# Patient Record
Sex: MALE | Race: WHITE | NOT HISPANIC OR LATINO | Employment: FULL TIME | URBAN - NONMETROPOLITAN AREA
[De-identification: names, ages, dates, MRNs, and addresses within clinical notes are randomized per-mention and may not be internally consistent; named-entity substitution may affect disease eponyms.]

---

## 2022-04-14 ENCOUNTER — APPOINTMENT (INPATIENT)
Dept: ULTRASOUND IMAGING | Facility: HOSPITAL | Age: 57
DRG: 853 | End: 2022-04-14
Payer: COMMERCIAL

## 2022-04-14 ENCOUNTER — APPOINTMENT (EMERGENCY)
Dept: RADIOLOGY | Facility: HOSPITAL | Age: 57
DRG: 853 | End: 2022-04-14
Payer: COMMERCIAL

## 2022-04-14 ENCOUNTER — HOSPITAL ENCOUNTER (INPATIENT)
Facility: HOSPITAL | Age: 57
LOS: 3 days | Discharge: HOME | DRG: 853 | End: 2022-04-17
Attending: EMERGENCY MEDICINE | Admitting: STUDENT IN AN ORGANIZED HEALTH CARE EDUCATION/TRAINING PROGRAM
Payer: COMMERCIAL

## 2022-04-14 ENCOUNTER — APPOINTMENT (EMERGENCY)
Dept: CT IMAGING | Facility: HOSPITAL | Age: 57
DRG: 853 | End: 2022-04-14
Payer: COMMERCIAL

## 2022-04-14 DIAGNOSIS — R06.02 SHORTNESS OF BREATH AT REST: ICD-10-CM

## 2022-04-14 DIAGNOSIS — K81.0 ACUTE CHOLECYSTITIS: ICD-10-CM

## 2022-04-14 DIAGNOSIS — K81.9 CHOLECYSTITIS: Primary | ICD-10-CM

## 2022-04-14 PROBLEM — I10 PRIMARY HYPERTENSION: Status: ACTIVE | Noted: 2022-04-14

## 2022-04-14 PROBLEM — K21.9 GERD (GASTROESOPHAGEAL REFLUX DISEASE): Status: ACTIVE | Noted: 2022-04-14

## 2022-04-14 LAB
ALBUMIN SERPL BCP-MCNC: 2.8 G/DL (ref 3.5–5)
ALP SERPL-CCNC: 123 U/L (ref 46–116)
ALT SERPL W P-5'-P-CCNC: 28 U/L (ref 12–78)
ANION GAP SERPL CALCULATED.3IONS-SCNC: 10 MMOL/L (ref 4–13)
AST SERPL W P-5'-P-CCNC: 48 U/L (ref 5–45)
ATRIAL RATE: 115 BPM
BACTERIA UR QL AUTO: NORMAL /HPF
BASOPHILS # BLD AUTO: 0.03 THOUSANDS/ΜL (ref 0–0.1)
BASOPHILS NFR BLD AUTO: 0 % (ref 0–1)
BILIRUB SERPL-MCNC: 0.8 MG/DL (ref 0.2–1)
BILIRUB UR QL STRIP: ABNORMAL
BUN SERPL-MCNC: 28 MG/DL (ref 5–25)
CALCIUM ALBUM COR SERPL-MCNC: 9.5 MG/DL (ref 8.3–10.1)
CALCIUM SERPL-MCNC: 8.5 MG/DL (ref 8.3–10.1)
CHLORIDE SERPL-SCNC: 96 MMOL/L (ref 100–108)
CLARITY UR: ABNORMAL
CO2 SERPL-SCNC: 24 MMOL/L (ref 21–32)
COLOR UR: YELLOW
CREAT SERPL-MCNC: 1.67 MG/DL (ref 0.6–1.3)
EOSINOPHIL # BLD AUTO: 0.03 THOUSAND/ΜL (ref 0–0.61)
EOSINOPHIL NFR BLD AUTO: 0 % (ref 0–6)
ERYTHROCYTE [DISTWIDTH] IN BLOOD BY AUTOMATED COUNT: 13.9 % (ref 11.6–15.1)
FLUAV RNA RESP QL NAA+PROBE: NEGATIVE
FLUBV RNA RESP QL NAA+PROBE: NEGATIVE
GFR SERPL CREATININE-BSD FRML MDRD: 44 ML/MIN/1.73SQ M
GLUCOSE SERPL-MCNC: 126 MG/DL (ref 65–140)
GLUCOSE UR STRIP-MCNC: NEGATIVE MG/DL
HCT VFR BLD AUTO: 44.8 % (ref 36.5–49.3)
HGB BLD-MCNC: 15.7 G/DL (ref 12–17)
HGB UR QL STRIP.AUTO: NEGATIVE
IMM GRANULOCYTES # BLD AUTO: 0.03 THOUSAND/UL (ref 0–0.2)
IMM GRANULOCYTES NFR BLD AUTO: 0 % (ref 0–2)
KETONES UR STRIP-MCNC: NEGATIVE MG/DL
LACTATE SERPL-SCNC: 1 MMOL/L (ref 0.5–2)
LEUKOCYTE ESTERASE UR QL STRIP: NEGATIVE
LIPASE SERPL-CCNC: 142 U/L (ref 73–393)
LYMPHOCYTES # BLD AUTO: 0.49 THOUSANDS/ΜL (ref 0.6–4.47)
LYMPHOCYTES NFR BLD AUTO: 4 % (ref 14–44)
MCH RBC QN AUTO: 31.2 PG (ref 26.8–34.3)
MCHC RBC AUTO-ENTMCNC: 35 G/DL (ref 31.4–37.4)
MCV RBC AUTO: 89 FL (ref 82–98)
MONOCYTES # BLD AUTO: 0.96 THOUSAND/ΜL (ref 0.17–1.22)
MONOCYTES NFR BLD AUTO: 8 % (ref 4–12)
NEUTROPHILS # BLD AUTO: 10.25 THOUSANDS/ΜL (ref 1.85–7.62)
NEUTS SEG NFR BLD AUTO: 88 % (ref 43–75)
NITRITE UR QL STRIP: NEGATIVE
NON-SQ EPI CELLS URNS QL MICRO: NORMAL /HPF
NRBC BLD AUTO-RTO: 0 /100 WBCS
P AXIS: 29 DEGREES
PH UR STRIP.AUTO: 6.5 [PH]
PLATELET # BLD AUTO: 166 THOUSANDS/UL (ref 149–390)
PMV BLD AUTO: 11.6 FL (ref 8.9–12.7)
POTASSIUM SERPL-SCNC: 3.4 MMOL/L (ref 3.5–5.3)
PR INTERVAL: 164 MS
PROT SERPL-MCNC: 7.5 G/DL (ref 6.4–8.2)
PROT UR STRIP-MCNC: ABNORMAL MG/DL
QRS AXIS: 263 DEGREES
QRSD INTERVAL: 128 MS
QT INTERVAL: 362 MS
QTC INTERVAL: 500 MS
RBC # BLD AUTO: 5.04 MILLION/UL (ref 3.88–5.62)
RBC #/AREA URNS AUTO: NORMAL /HPF
RSV RNA RESP QL NAA+PROBE: NEGATIVE
SARS-COV-2 RNA RESP QL NAA+PROBE: NEGATIVE
SODIUM SERPL-SCNC: 130 MMOL/L (ref 136–145)
SP GR UR STRIP.AUTO: <=1.005 (ref 1–1.03)
T WAVE AXIS: 9 DEGREES
UROBILINOGEN UR QL STRIP.AUTO: 1 E.U./DL
VENTRICULAR RATE: 115 BPM
WBC # BLD AUTO: 11.79 THOUSAND/UL (ref 4.31–10.16)
WBC #/AREA URNS AUTO: NORMAL /HPF

## 2022-04-14 PROCEDURE — 36415 COLL VENOUS BLD VENIPUNCTURE: CPT | Performed by: EMERGENCY MEDICINE

## 2022-04-14 PROCEDURE — 93010 ELECTROCARDIOGRAM REPORT: CPT | Performed by: INTERNAL MEDICINE

## 2022-04-14 PROCEDURE — 99285 EMERGENCY DEPT VISIT HI MDM: CPT | Performed by: EMERGENCY MEDICINE

## 2022-04-14 PROCEDURE — 83690 ASSAY OF LIPASE: CPT | Performed by: EMERGENCY MEDICINE

## 2022-04-14 PROCEDURE — C9113 INJ PANTOPRAZOLE SODIUM, VIA: HCPCS | Performed by: PHYSICIAN ASSISTANT

## 2022-04-14 PROCEDURE — 74177 CT ABD & PELVIS W/CONTRAST: CPT

## 2022-04-14 PROCEDURE — NC001 PR NO CHARGE: Performed by: PHYSICIAN ASSISTANT

## 2022-04-14 PROCEDURE — 96360 HYDRATION IV INFUSION INIT: CPT

## 2022-04-14 PROCEDURE — 99233 SBSQ HOSP IP/OBS HIGH 50: CPT | Performed by: INTERNAL MEDICINE

## 2022-04-14 PROCEDURE — 81001 URINALYSIS AUTO W/SCOPE: CPT | Performed by: EMERGENCY MEDICINE

## 2022-04-14 PROCEDURE — 83605 ASSAY OF LACTIC ACID: CPT | Performed by: EMERGENCY MEDICINE

## 2022-04-14 PROCEDURE — 99285 EMERGENCY DEPT VISIT HI MDM: CPT

## 2022-04-14 PROCEDURE — 76705 ECHO EXAM OF ABDOMEN: CPT

## 2022-04-14 PROCEDURE — 0241U HB NFCT DS VIR RESP RNA 4 TRGT: CPT | Performed by: EMERGENCY MEDICINE

## 2022-04-14 PROCEDURE — 85025 COMPLETE CBC W/AUTO DIFF WBC: CPT | Performed by: EMERGENCY MEDICINE

## 2022-04-14 PROCEDURE — 80053 COMPREHEN METABOLIC PANEL: CPT | Performed by: EMERGENCY MEDICINE

## 2022-04-14 PROCEDURE — G1004 CDSM NDSC: HCPCS

## 2022-04-14 PROCEDURE — 93005 ELECTROCARDIOGRAM TRACING: CPT

## 2022-04-14 PROCEDURE — 71045 X-RAY EXAM CHEST 1 VIEW: CPT

## 2022-04-14 RX ORDER — ACETAMINOPHEN 325 MG/1
650 TABLET ORAL ONCE
Status: COMPLETED | OUTPATIENT
Start: 2022-04-14 | End: 2022-04-14

## 2022-04-14 RX ORDER — FUROSEMIDE 20 MG/1
20 TABLET ORAL DAILY
COMMUNITY

## 2022-04-14 RX ORDER — HEPARIN SODIUM 5000 [USP'U]/ML
5000 INJECTION, SOLUTION INTRAVENOUS; SUBCUTANEOUS EVERY 8 HOURS SCHEDULED
Status: DISCONTINUED | OUTPATIENT
Start: 2022-04-14 | End: 2022-04-15

## 2022-04-14 RX ORDER — SODIUM CHLORIDE 9 MG/ML
125 INJECTION, SOLUTION INTRAVENOUS CONTINUOUS
Status: DISCONTINUED | OUTPATIENT
Start: 2022-04-14 | End: 2022-04-15

## 2022-04-14 RX ORDER — OMEPRAZOLE 40 MG/1
40 CAPSULE, DELAYED RELEASE ORAL DAILY
COMMUNITY

## 2022-04-14 RX ORDER — AMLODIPINE BESYLATE 10 MG/1
10 TABLET ORAL DAILY
COMMUNITY

## 2022-04-14 RX ORDER — HYDROMORPHONE HCL/PF 1 MG/ML
0.5 SYRINGE (ML) INJECTION EVERY 4 HOURS PRN
Status: DISCONTINUED | OUTPATIENT
Start: 2022-04-14 | End: 2022-04-17 | Stop reason: HOSPADM

## 2022-04-14 RX ORDER — HYDROCHLOROTHIAZIDE 12.5 MG/1
12.5 TABLET ORAL DAILY
COMMUNITY

## 2022-04-14 RX ORDER — HYDRALAZINE HYDROCHLORIDE 20 MG/ML
10 INJECTION INTRAMUSCULAR; INTRAVENOUS EVERY 6 HOURS PRN
Status: DISCONTINUED | OUTPATIENT
Start: 2022-04-14 | End: 2022-04-17 | Stop reason: HOSPADM

## 2022-04-14 RX ORDER — ONDANSETRON 2 MG/ML
4 INJECTION INTRAMUSCULAR; INTRAVENOUS EVERY 8 HOURS PRN
Status: DISCONTINUED | OUTPATIENT
Start: 2022-04-14 | End: 2022-04-17 | Stop reason: HOSPADM

## 2022-04-14 RX ORDER — FENOFIBRATE 54 MG/1
54 TABLET ORAL DAILY
COMMUNITY

## 2022-04-14 RX ORDER — HYDROMORPHONE HCL IN WATER/PF 6 MG/30 ML
0.2 PATIENT CONTROLLED ANALGESIA SYRINGE INTRAVENOUS EVERY 4 HOURS PRN
Status: DISCONTINUED | OUTPATIENT
Start: 2022-04-14 | End: 2022-04-16

## 2022-04-14 RX ORDER — AMLODIPINE BESYLATE 10 MG/1
10 TABLET ORAL DAILY
Status: DISCONTINUED | OUTPATIENT
Start: 2022-04-14 | End: 2022-04-17 | Stop reason: HOSPADM

## 2022-04-14 RX ORDER — LANOLIN ALCOHOL/MO/W.PET/CERES
3 CREAM (GRAM) TOPICAL ONCE
Status: COMPLETED | OUTPATIENT
Start: 2022-04-14 | End: 2022-04-14

## 2022-04-14 RX ORDER — ACETAMINOPHEN 325 MG/1
650 TABLET ORAL EVERY 6 HOURS PRN
Status: DISCONTINUED | OUTPATIENT
Start: 2022-04-14 | End: 2022-04-17 | Stop reason: HOSPADM

## 2022-04-14 RX ORDER — PANTOPRAZOLE SODIUM 40 MG/1
40 INJECTION, POWDER, FOR SOLUTION INTRAVENOUS
Status: DISCONTINUED | OUTPATIENT
Start: 2022-04-14 | End: 2022-04-17 | Stop reason: HOSPADM

## 2022-04-14 RX ORDER — LISINOPRIL 40 MG/1
40 TABLET ORAL DAILY
COMMUNITY

## 2022-04-14 RX ADMIN — SODIUM CHLORIDE 1000 ML: 0.9 INJECTION, SOLUTION INTRAVENOUS at 11:27

## 2022-04-14 RX ADMIN — HEPARIN SODIUM 5000 UNITS: 5000 INJECTION INTRAVENOUS; SUBCUTANEOUS at 21:23

## 2022-04-14 RX ADMIN — AMLODIPINE BESYLATE 10 MG: 10 TABLET ORAL at 14:38

## 2022-04-14 RX ADMIN — ACETAMINOPHEN 325MG 650 MG: 325 TABLET ORAL at 13:22

## 2022-04-14 RX ADMIN — IOHEXOL 100 ML: 350 INJECTION, SOLUTION INTRAVENOUS at 12:55

## 2022-04-14 RX ADMIN — Medication 3 MG: at 21:22

## 2022-04-14 RX ADMIN — HEPARIN SODIUM 5000 UNITS: 5000 INJECTION INTRAVENOUS; SUBCUTANEOUS at 14:38

## 2022-04-14 RX ADMIN — PIPERACILLIN AND TAZOBACTAM 3.38 G: 36; 4.5 INJECTION, POWDER, FOR SOLUTION INTRAVENOUS at 21:59

## 2022-04-14 RX ADMIN — ACETAMINOPHEN 325MG 650 MG: 325 TABLET ORAL at 19:43

## 2022-04-14 RX ADMIN — SODIUM CHLORIDE 125 ML/HR: 0.9 INJECTION, SOLUTION INTRAVENOUS at 14:51

## 2022-04-14 RX ADMIN — PIPERACILLIN AND TAZOBACTAM 3.38 G: 36; 4.5 INJECTION, POWDER, FOR SOLUTION INTRAVENOUS at 15:51

## 2022-04-14 RX ADMIN — PANTOPRAZOLE SODIUM 40 MG: 40 INJECTION, POWDER, FOR SOLUTION INTRAVENOUS at 14:38

## 2022-04-14 NOTE — H&P
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  - Hiatal hernia  - Hx of Perry's esophagus  - C/O short of breath, o2 SAT 97% on RA, chest XR without abnormality, CT with trace right pleural effusion  - Tmax 102 6 in ED, denies subjective fevers at this time  - Leukocytosis 11 79  - Lipase 142  - Tbili 0 80  - Lactic acid 1 0  - PRICILLA, Cr 1 67, likely dehydration  - eGFR 44  - Hx of HTN  - Diverticulosis found on CT  - Hx of bilateral inguinal hernias as a child  - COVID negative at this time, positive several months ago    Plan:  Admit to general surgery  Consult SLIM for SOB  NPO   RUQ US (ordered)  Medicate PRN pain/nausea  IV PPI  IVF hydration  Monitor I/Os  Heparin anticoagulation  SCD's  Urinary retention protocol  Follow qAM CBC and CMP  UA pending  Appreciate SLIM recs for comorbidities      History of Present Illness     HPI:  Ciara Fox is a 60 y o  male with a PMH of Perry's esophagus and HTN who presents with complaints of abdominal pain, nasuea/vomiting, chills and fevers that began Saturday  He states that his wife works at a nursing home and came home with a "GI bug" on Friday experiencing similar symptoms  Her issues have since resolved  He continues with complaints of short of breath, nausea, and epigastric pain that does not radiated  Denies chest pain  He last ate scrambled eggs this morning and did not experience n/v with this meal  He states that for the most part his abdominal pain has resolved and complains mainly of short of breath  Denies prior similar symptoms  He states that his urine has been concentrated and his mouth is dry from not drinking over the past several days  He also states that everything has a metallic taste since he had COVID   CT in the ED revealed "Diffusely thick-walled with surrounding edema and fluid   There is focal hyperdensity along the proximal lateral wall as noted on series 2/37, which is potentially external; this hyperdensity is along the course of the cystic artery, and when compared to the adjacent cystic duct shows focal dilation at this level "     Review of Systems   Constitutional: Positive for appetite change, fatigue and fever  HENT: Negative  Eyes: Negative  Respiratory: Positive for shortness of breath  Cardiovascular: Negative  Gastrointestinal: Positive for abdominal pain, nausea and vomiting  Negative for abdominal distention, blood in stool, constipation and diarrhea  Endocrine: Negative  Genitourinary: Positive for decreased urine volume  Musculoskeletal: Negative  Skin: Negative  Neurological: Negative  Hematological: Negative  Psychiatric/Behavioral: Negative  Historical Information   Past Medical History:   Diagnosis Date    Eprry's esophageal ulceration 01/05/2002    Hypertension      History reviewed  No pertinent surgical history    Social History   Social History     Substance and Sexual Activity   Alcohol Use Yes    Alcohol/week: 4 0 standard drinks    Types: 4 Cans of beer per week     Social History     Substance and Sexual Activity   Drug Use Not on file     Social History     Tobacco Use   Smoking Status Never Smoker   Smokeless Tobacco Not on file     E-Cigarette/Vaping     E-Cigarette/Vaping Substances     Family History: non-contributory    Meds/Allergies   all medications and allergies reviewed  No Known Allergies    Objective   First Vitals:   Blood Pressure: (!) 178/89 (04/14/22 1116)  Pulse: (!) 117 (04/14/22 1116)  Temperature: 98 1 °F (36 7 °C) (04/14/22 1116)  Temp Source: Temporal (04/14/22 1116)  Respirations: (!) 28 (04/14/22 1116)  Height: 6' 4" (193 cm) (04/14/22 1116)  Weight - Scale: 132 kg (292 lb 1 8 oz) (04/14/22 1116)  SpO2: 95 % (04/14/22 1116)    Current Vitals:   Blood Pressure: 162/82 (04/14/22 1230)  Pulse: 101 (04/14/22 1230)  Temperature: (!) 102 6 °F (39 2 °C) (04/14/22 1316)  Temp Source: Oral (04/14/22 1316)  Respirations: 21 (04/14/22 1230)  Height: 6' 4" (193 cm) (04/14/22 1116)  Weight - Scale: 132 kg (292 lb 1 8 oz) (04/14/22 1116)  SpO2: 97 % (04/14/22 1230)    No intake or output data in the 24 hours ending 04/14/22 1352    Invasive Devices  Report    Peripheral Intravenous Line            Peripheral IV 04/14/22 Right Antecubital <1 day                Physical Exam  Vitals and nursing note reviewed  Constitutional:       General: He is not in acute distress  Appearance: He is obese  He is not ill-appearing  HENT:      Head: Normocephalic and atraumatic  Right Ear: External ear normal       Left Ear: External ear normal       Nose: Nose normal       Mouth/Throat:      Mouth: Mucous membranes are dry  Pharynx: Oropharynx is clear  No oropharyngeal exudate or posterior oropharyngeal erythema  Eyes:      General: No scleral icterus  Extraocular Movements: Extraocular movements intact  Conjunctiva/sclera: Conjunctivae normal       Pupils: Pupils are equal, round, and reactive to light  Cardiovascular:      Rate and Rhythm: Normal rate and regular rhythm  Pulses: Normal pulses  Heart sounds: Normal heart sounds  No murmur heard  Pulmonary:      Effort: Pulmonary effort is normal  No respiratory distress  Breath sounds: Normal breath sounds  Chest:      Chest wall: No tenderness  Abdominal:      Comments: Obese, minimal tenderness in RUQ, negative Cox sign, softly distended (baseline per patient), no guarding or rebound, bowel sounds present, no masses, organomegaly or hernias appreciated   Musculoskeletal:         General: Normal range of motion  Cervical back: Normal range of motion and neck supple  No rigidity  Right lower leg: No edema  Left lower leg: No edema  Skin:     General: Skin is warm and dry  Capillary Refill: Capillary refill takes less than 2 seconds  Coloration: Skin is not jaundiced  Neurological:      General: No focal deficit present  Mental Status: He is alert and oriented to person, place, and time  Psychiatric:         Mood and Affect: Mood normal          Behavior: Behavior normal          Thought Content: Thought content normal          Judgment: Judgment normal        Lab Results:   I have personally reviewed pertinent lab results  CBC:   Lab Results   Component Value Date    WBC 11 79 (H) 04/14/2022    HGB 15 7 04/14/2022    HCT 44 8 04/14/2022    MCV 89 04/14/2022     04/14/2022    MCH 31 2 04/14/2022    MCHC 35 0 04/14/2022    RDW 13 9 04/14/2022    MPV 11 6 04/14/2022    NRBC 0 04/14/2022     CMP:   Lab Results   Component Value Date    SODIUM 130 (L) 04/14/2022    K 3 4 (L) 04/14/2022    CL 96 (L) 04/14/2022    CO2 24 04/14/2022    BUN 28 (H) 04/14/2022    CREATININE 1 67 (H) 04/14/2022    CALCIUM 8 5 04/14/2022    AST 48 (H) 04/14/2022    ALT 28 04/14/2022    ALKPHOS 123 (H) 04/14/2022    EGFR 44 04/14/2022     Lipase:   Lab Results   Component Value Date    LIPASE 142 04/14/2022     Imaging: I have personally reviewed pertinent reports  CT abd/pelvis w contrast 4/14/22  GALLBLADDER:  Diffusely thick-walled with surrounding edema and fluid  There is focal hyperdensity along the proximal lateral wall as noted on series 2/37, which is potentially external; this hyperdensity is along the course of the cystic artery, and when compared to the adjacent cystic duct shows focal dilation at this level  Impression:     Findings consistent with acute cholecystitis  William Gavel is a focal hyperdensity near the wall as described, this either represents a stone, or may represent a focal aneurysm of the cystic artery   Consider ultrasound for further evaluation of this would alter surgical management        EKG, Pathology, and Other Studies: I have personally reviewed pertinent reports  Counseling / Coordination of Care  Total floor / unit time spent today 40 minutes  Greater than 50% of total time was spent with the patient and / or family counseling and / or coordination of care  A description of the counseling / coordination of care: review of labs and imaging, obtaining history, performing exam, discussion of treatment plan      Katey Thrasher PA-C

## 2022-04-14 NOTE — ED PROVIDER NOTES
History  Chief Complaint   Patient presents with    Shortness of Breath     Over the weekend with fever, aching joints, chills, nausea and diarrhea  Now with increased thirst  Patient states "I don't feel short of breath but I breath heavily"     Patient states starting 1 week ago he had 3 days of abdominal pain which have since resolved  He states he had diarrhea, nausea, chills, fatigue, vomiting, subjective fevers, body aches, shortness of breath  He states he has had 2 COVID vaccinations  He denies chest pain  History provided by:  Patient   used: No    Flu Symptoms  Presenting symptoms: diarrhea, fatigue, fever, myalgias, nausea, shortness of breath and sore throat    Presenting symptoms: no cough, no headaches and no vomiting    Severity:  Moderate  Onset quality:  Gradual  Duration:  1 week  Progression:  Unchanged  Chronicity:  New  Relieved by:  Nothing  Worsened by:  Nothing  Ineffective treatments:  None tried  Associated symptoms: chills    Associated symptoms: no ear pain, no neck stiffness and no syncope    Associated symptoms comment:  Abdominal pain      Prior to Admission Medications   Prescriptions Last Dose Informant Patient Reported? Taking? amLODIPine (NORVASC) 10 mg tablet   Yes No   Sig: Take 10 mg by mouth daily   fenofibrate (TRICOR) 54 MG tablet   Yes No   Sig: Take 54 mg by mouth daily   furosemide (LASIX) 20 mg tablet   Yes No   Sig: Take 20 mg by mouth daily   hydrochlorothiazide (HYDRODIURIL) 12 5 mg tablet   Yes No   Sig: Take 12 5 mg by mouth daily   lisinopril (ZESTRIL) 40 mg tablet   Yes No   Sig: Take 40 mg by mouth daily   omeprazole (PriLOSEC) 40 MG capsule   Yes No   Sig: Take 40 mg by mouth daily      Facility-Administered Medications: None       Past Medical History:   Diagnosis Date    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    Hypertension        History reviewed  No pertinent surgical history  History reviewed   No pertinent family history  I have reviewed and agree with the history as documented  E-Cigarette/Vaping     E-Cigarette/Vaping Substances     Social History     Tobacco Use    Smoking status: Never Smoker    Smokeless tobacco: Not on file   Substance Use Topics    Alcohol use: Yes     Alcohol/week: 4 0 standard drinks     Types: 4 Cans of beer per week    Drug use: Not on file       Review of Systems   Constitutional: Positive for chills, fatigue and fever  HENT: Positive for sore throat  Negative for ear pain, hearing loss, trouble swallowing and voice change  Eyes: Negative for pain and discharge  Respiratory: Positive for shortness of breath  Negative for cough and wheezing  Cardiovascular: Negative for chest pain and palpitations  Gastrointestinal: Positive for diarrhea and nausea  Negative for abdominal pain, blood in stool, constipation and vomiting  Genitourinary: Negative for dysuria, flank pain, frequency and hematuria  Musculoskeletal: Positive for myalgias  Negative for joint swelling, neck pain and neck stiffness  Skin: Negative for rash and wound  Neurological: Negative for dizziness, seizures, syncope, facial asymmetry and headaches  Psychiatric/Behavioral: Negative for hallucinations, self-injury and suicidal ideas  All other systems reviewed and are negative  Physical Exam  Physical Exam  Vitals and nursing note reviewed  Constitutional:       General: He is not in acute distress  Appearance: He is well-developed  HENT:      Head: Normocephalic and atraumatic  Right Ear: External ear normal       Left Ear: External ear normal    Eyes:      General: No scleral icterus  Right eye: No discharge  Left eye: No discharge  Extraocular Movements: Extraocular movements intact  Conjunctiva/sclera: Conjunctivae normal    Cardiovascular:      Rate and Rhythm: Regular rhythm  Tachycardia present  Heart sounds: Normal heart sounds   No murmur heard       Pulmonary:      Effort: Pulmonary effort is normal       Breath sounds: Normal breath sounds  No decreased breath sounds, wheezing, rhonchi or rales  Abdominal:      General: Bowel sounds are normal  There is no distension  Palpations: Abdomen is soft  Tenderness: There is no abdominal tenderness  There is no guarding or rebound  Musculoskeletal:         General: No deformity  Normal range of motion  Cervical back: Normal range of motion and neck supple  Skin:     General: Skin is warm and dry  Capillary Refill: Capillary refill takes less than 2 seconds  Findings: No rash  Neurological:      General: No focal deficit present  Mental Status: He is alert and oriented to person, place, and time  Cranial Nerves: No cranial nerve deficit  Psychiatric:         Mood and Affect: Mood normal          Behavior: Behavior normal          Thought Content:  Thought content normal          Judgment: Judgment normal          Vital Signs  ED Triage Vitals [04/14/22 1116]   Temperature Pulse Respirations Blood Pressure SpO2   98 1 °F (36 7 °C) (!) 117 (!) 28 (!) 178/89 95 %      Temp Source Heart Rate Source Patient Position - Orthostatic VS BP Location FiO2 (%)   Temporal Monitor -- Left arm --      Pain Score       No Pain           Vitals:    04/14/22 1116 04/14/22 1200 04/14/22 1230 04/14/22 1437   BP: (!) 178/89 156/80 162/82 162/82   Pulse: (!) 117 105 101 100         Visual Acuity      ED Medications  Medications   ondansetron (ZOFRAN) injection 4 mg (has no administration in time range)   heparin (porcine) subcutaneous injection 5,000 Units (5,000 Units Subcutaneous Given 4/14/22 1438)   HYDROmorphone (DILAUDID) injection 0 5 mg (has no administration in time range)   HYDROmorphone HCl (DILAUDID) injection 0 2 mg (has no administration in time range)   pantoprazole (PROTONIX) injection 40 mg (40 mg Intravenous Given 4/14/22 1438)   amLODIPine (NORVASC) tablet 10 mg (10 mg Oral Given 4/14/22 1438)   sodium chloride 0 9 % infusion (125 mL/hr Intravenous New Bag 4/14/22 1451)   sodium chloride 0 9 % bolus 1,000 mL (0 mL Intravenous Stopped 4/14/22 1227)   iohexol (OMNIPAQUE) 350 MG/ML injection (SINGLE-DOSE) 100 mL (100 mL Intravenous Given 4/14/22 1255)   acetaminophen (TYLENOL) tablet 650 mg (650 mg Oral Given 4/14/22 1322)       Diagnostic Studies  Results Reviewed     Procedure Component Value Units Date/Time    Comprehensive metabolic panel [[id number]]  (Abnormal) Collected: 04/14/22 1126    Lab Status: Final result Specimen: Blood from Arm, Right Updated: 04/14/22 1213     Sodium 130 mmol/L      Potassium 3 4 mmol/L      Chloride 96 mmol/L      CO2 24 mmol/L      ANION GAP 10 mmol/L      BUN 28 mg/dL      Creatinine 1 67 mg/dL      Glucose 126 mg/dL      Calcium 8 5 mg/dL      Corrected Calcium 9 5 mg/dL      AST 48 U/L      ALT 28 U/L      Alkaline Phosphatase 123 U/L      Total Protein 7 5 g/dL      Albumin 2 8 g/dL      Total Bilirubin 0 80 mg/dL      eGFR 44 ml/min/1 73sq m     Narrative:      [place] guidelines for Chronic Kidney Disease (CKD):     Stage 1 with normal or high GFR (GFR > 90 mL/min/1 73 square meters)    Stage 2 Mild CKD (GFR = 60-89 mL/min/1 73 square meters)    Stage 3A Moderate CKD (GFR = 45-59 mL/min/1 73 square meters)    Stage 3B Moderate CKD (GFR = 30-44 mL/min/1 73 square meters)    Stage 4 Severe CKD (GFR = 15-29 mL/min/1 73 square meters)    Stage 5 End Stage CKD (GFR <15 mL/min/1 73 square meters)  Note: GFR calculation is accurate only with a steady state creatinine    Lipase [[id number]]  (Normal) Collected: 04/14/22 1126    Lab Status: Final result Specimen: Blood from Arm, Right Updated: 04/14/22 1213     Lipase 142 u/L     COVID19, Influenza A/B, RSV PCR, [facility] [[id number]]  (Normal) Collected: 04/14/22 1126    Lab Status: Final result Specimen: Nares from Nose Updated: 04/14/22 1211     SARS-CoV-2 Negative INFLUENZA A PCR Negative     INFLUENZA B PCR Negative     RSV PCR Negative    Narrative:      FOR PEDIATRIC PATIENTS - copy/paste COVID Guidelines URL to browser: [email or web address]    SARS-CoV-2 assay is a Nucleic Acid Amplification assay intended for the  qualitative detection of nucleic acid from SARS-CoV-2 in nasopharyngeal  swabs  Results are for the presumptive identification of SARS-CoV-2 RNA  Positive results are indicative of infection with SARS-CoV-2, the virus  causing COVID-19, but do not rule out bacterial infection or co-infection  with other viruses  Laboratories within the [place] and its  territories are required to report all positive results to the appropriate  public health authorities  Negative results do not preclude SARS-CoV-2  infection and should not be used as the sole basis for treatment or other  patient management decisions  Negative results must be combined with  clinical observations, patient history, and epidemiological information  This test has not been FDA cleared or approved  This test has been authorized by FDA under an Emergency Use Authorization  (EUA)  This test is only authorized for the duration of time the  declaration that circumstances exist justifying the authorization of the  emergency use of an in vitro diagnostic tests for detection of SARS-CoV-2  virus and/or diagnosis of COVID-19 infection under section 564(b)(1) of  the Act, 21 U  S C  220HFX-0(R)(1), unless the authorization is terminated  or revoked sooner  The test has been validated but independent review by FDA  and CLIA is pending  Test performed using [employer] Gene[employer]pert: This RT-PCR assay targets N2,  a region unique to SARS-CoV-2  A conserved region in the E-gene was chosen  for pan-Sarbecovirus detection which includes SARS-CoV-2      Lactic acid, plasma [[id number]]  (Normal) Collected: 04/14/22 1126    Lab Status: Final result Specimen: Blood from Arm, Right Updated: 04/14/22 1159     LACTIC ACID 1 0 mmol/L     Narrative:      Result may be elevated if tourniquet was used during collection  CBC and differential [[id number]]  (Abnormal) Collected: 04/14/22 1126    Lab Status: Final result Specimen: Blood from Arm, Right Updated: 04/14/22 1135     WBC 11 79 Thousand/uL      RBC 5 04 Million/uL      Hemoglobin 15 7 g/dL      Hematocrit 44 8 %      MCV 89 fL      MCH 31 2 pg      MCHC 35 0 g/dL      RDW 13 9 %      MPV 11 6 fL      Platelets 493 Thousands/uL      nRBC 0 /100 WBCs      Neutrophils Relative 88 %      Immat GRANS % 0 %      Lymphocytes Relative 4 %      Monocytes Relative 8 %      Eosinophils Relative 0 %      Basophils Relative 0 %      Neutrophils Absolute 10 25 Thousands/µL      Immature Grans Absolute 0 03 Thousand/uL      Lymphocytes Absolute 0 49 Thousands/µL      Monocytes Absolute 0 96 Thousand/µL      Eosinophils Absolute 0 03 Thousand/µL      Basophils Absolute 0 03 Thousands/µL     UA w Reflex to Microscopic w Reflex to Culture [[id number]]     Lab Status: No result Specimen: Urine                  CT abdomen pelvis w contrast   Final Result by Ab Casey MD (04/14 1317)      Findings consistent with acute cholecystitis  There is a focal hyperdensity near the wall as described, this either represents a stone, or may represent a focal aneurysm of the cystic artery  Consider ultrasound for further evaluation of this would    altered surgical management  I personally discussed this study  with Cande Domingo on 4/14/2022 at 1:15 PM             Workstation performed: [id number]         XR chest portable   Final Result by Ciara Escalante MD (04/14 1200)      No acute cardiopulmonary disease                    Workstation performed: [id number]         US right upper quadrant    (Results Pending)              Procedures  ECG 12 Lead Documentation Only    Date/Time: 4/14/2022 11:16 AM  Performed by: Stacey Duran MD  Authorized by: Sid Nunez MD     ECG reviewed by me, the ED Provider: yes    Patient location:  ED  Previous ECG:     Previous ECG:  Unavailable  Interpretation:     Interpretation: non-specific    Rate:     ECG rate:  115    ECG rate assessment: tachycardic    Rhythm:     Rhythm: sinus tachycardia    Ectopy:     Ectopy: none    QRS:     QRS axis:  Normal    QRS intervals:  Normal  Conduction:     Conduction: abnormal      Abnormal conduction: complete RBBB    ST segments:     ST segments:  Normal  T waves:     T waves: normal               ED Course  ED Course as of 04/14/22 1509   Thu Apr 14, 2022   1320 Surgery contacted                               SBIRT 22yo+      Most Recent Value   SBIRT (25 yo +)    In order to provide better care to our patients, we are screening all of our patients for alcohol and drug use  Would it be okay to ask you these screening questions? Yes Filed at: 04/14/2022 1120   Initial Alcohol Screen: US AUDIT-C     1  How often do you have a drink containing alcohol? 1 Filed at: 04/14/2022 1120   2  How many drinks containing alcohol do you have on a typical day you are drinking? 0 Filed at: 04/14/2022 1120   3a  Male UNDER 65: How often do you have five or more drinks on one occasion? 0 Filed at: 04/14/2022 1120   3b  FEMALE Any Age, or MALE 65+: How often do you have 4 or more drinks on one occassion? 0 Filed at: 04/14/2022 1120   Audit-C Score 1 Filed at: 04/14/2022 1120   YUNI: How many times in the past year have you    Used an illegal drug or used a prescription medication for non-medical reasons?  Never Filed at: 04/14/2022 1120                    MDM  Number of Diagnoses or Management Options     Amount and/or Complexity of Data Reviewed  Clinical lab tests: ordered and reviewed  Tests in the radiology section of CPT®: ordered and reviewed  Decide to obtain previous medical records or to obtain history from someone other than the patient: yes  Review and summarize past medical records: yes  Independent visualization of images, tracings, or specimens: yes        Disposition  Final diagnoses:   Cholecystitis     Time reflects when diagnosis was documented in both MDM as applicable and the Disposition within this note     Time User Action Codes Description Comment    4/14/2022  1:47 PM Alishamary lou Alexander Add [K81 9] Cholecystitis     4/14/2022  2:30 PM Maciel Yancey Add [R06 02] Shortness of breath at rest       ED Disposition     ED Disposition Condition Date/Time Comment    Admit Stable Thu Apr 14, 2022  1:47 PM Case was discussed with Dr Rachel Fuentes and the patient's admission status was agreed to be Admission Status: inpatient status to the service of Dr Rachel Fuentes   Follow-up Information    None         Patient's Medications   Discharge Prescriptions    No medications on file       No discharge procedures on file      PDMP Review     None          ED Provider  Electronically Signed by           Marina Fu MD  04/14/22 7468

## 2022-04-14 NOTE — ASSESSMENT & PLAN NOTE
- presents with five-day history of abdominal pain, nausea, and vomiting  - CT scan consistent with acute cholecystitis    - admitted to general surgery service  - start empiric Zosyn  - follow-up right upper quadrant ultrasound  - possible OR tomorrow  - NPO sips with meds

## 2022-04-14 NOTE — ASSESSMENT & PLAN NOTE
- hold home HCTZ, lasix and lisinopril for now as patient is NPO and dehydrated  - continue home amlodipine  - p r n  hydralazine for SBP greater than 180

## 2022-04-14 NOTE — CONSULTS
[address] [facility],[address] Floor 1965, 60 y o  male MRN: [id number]  Unit/Bed#: ED 01 Encounter: [id number]  Primary Care Provider: No primary care provider on file  Date and time admitted to hospital: 4/14/2022 11:11 AM    Inpatient consult to Internal Medicine  Consult performed by: Hope Bonilla DO  Consult ordered by: Arsen Sprague PA-C          * Acute cholecystitis  Assessment & Plan  - presents with five-day history of abdominal pain, nausea, and vomiting  - CT scan consistent with acute cholecystitis    - admitted to general surgery service  - start empiric Zosyn  - follow-up right upper quadrant ultrasound  - possible OR tomorrow  - NPO sips with meds      Primary hypertension  Assessment & Plan  - hold home HCTZ, lasix and lisinopril for now as patient is NPO and dehydrated  - continue home amlodipine  - p r n  hydralazine for SBP greater than 180    GERD (gastroesophageal reflux disease)  Assessment & Plan  - continue PPI        VTE Prophylaxis:   Moderate Risk (Score 3-4) - Pharmacological DVT Prophylaxis Ordered: heparin  Counseling / Coordination of Care Time: 45 minutes Greater than 50% of total time spent on patient counseling and coordination of care  Collaboration of Care: Were Recommendations Directly Discussed with Primary Treatment Team? Yes    History of Present Illness: Jethro Meek is a 60 y o  male with past medical history notable for hypertension and hyperlipidemia who presented to the [facility] ER on 04/14/2022 with chief complaint of abdominal pain, nausea, and vomiting  He states that he has had symptoms for approximately last 5 days and have been waxing waning  He has also had intermittent fevers and chills throughout this whole time  In the emergency room he was found to have CT findings consistent with acute cholecystitis and admitted to the general surgery service    I right upper quadrant ultrasound for follow-up is pending  At the time of my evaluation patient was diaphoretic and unwell appearing though pleasant and conversive  Possible OR tomorrow  Hospitalist consulted for medical management  REVIEW OF SYSTEMS  General Positive for fevers or chills  Admits to generalized fatigue and weakness  HEENT Denies hearing or vision changes  Cardiovascular Denies chest pain  Denies LE swelling  Denies palpitations  Denies dyspnea on exertion  Respiratory Denies cough  Denies difficulty breathing  Denies shortness of breath  Genitourinary Denies hematuria  Denies dysuria  Denies difficulty voiding  Denies incontinence  Gastrointestinal Positive for nausea  Positive for vomiting  Positive for decreased appetite  Positive for abdominal pain  Musculoskeletal Denies arthralgias or myalgias  Denies joint swelling  Psychiatric  Denies changes in mood  Denies anxiety or depression  Neurologic Denies headache  Denies lightheadedness/dizziness  Denies numbness/tingling  Denies weakness  Endocrine Denies weight loss or weight gain  Denies excessive thirst, sweating, urination  Past Medical and Surgical History:   Past Medical History:   Diagnosis Date    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    Hypertension        History reviewed  No pertinent surgical history  Meds/Allergies:  all medications and allergies reviewed    Allergies: No Known Allergies    Social History:  Marital Status: /Civil Union  Substance Use History:   Social History     Substance and Sexual Activity   Alcohol Use Yes    Alcohol/week: 4 0 standard drinks    Types: 4 Cans of beer per week     Social History     Tobacco Use   Smoking Status Never Smoker   Smokeless Tobacco Not on file     Social History     Substance and Sexual Activity   Drug Use Not on file       Family History:  History reviewed  No pertinent family history      Physical Exam:   Vitals:   Blood Pressure: 162/82 (04/14/22 1437)  Pulse: 100 (04/14/22 1437)  Temperature: (!) 102 6 °F (39 2 °C) (04/14/22 1316)  Temp Source: Oral (04/14/22 1316)  Respirations: 20 (04/14/22 1437)  Height: 6' 4" (193 cm) (04/14/22 1116)  Weight - Scale: 132 kg (292 lb 1 8 oz) (04/14/22 1116)  SpO2: 93 % (04/14/22 1437)    PHYSICAL EXAM:    Vitals signs reviewed  Constitutional   Awake and cooperative  Diaphoretic and ill-appearing nontoxic  Head/Neck   Normocephalic  Atraumatic  HEENT   No scleral icterus  EOMI  Heart   Regular rate and rhythm  No murmers  Lungs   Clear to auscultation bilaterally  Respirations unlaboured  Abdomen   Obese  Soft  Tenderness in the right upper and epigastric regions  no guarding  Skin   Skin color normal  No rashes  Extremities   No deformities  No peripheral edema  Neuro   Alert and oriented  No new deficits  Psych   Mood stable  Affect normal          Additional Data:   Lab Results:    Results from last 7 days   Lab Units 04/14/22  1126   WBC Thousand/uL 11 79*   HEMOGLOBIN g/dL 15 7   HEMATOCRIT % 44 8   PLATELETS Thousands/uL 166   NEUTROS PCT % 88*   LYMPHS PCT % 4*   MONOS PCT % 8   EOS PCT % 0     Results from last 7 days   Lab Units 04/14/22  1126   SODIUM mmol/L 130*   POTASSIUM mmol/L 3 4*   CHLORIDE mmol/L 96*   CO2 mmol/L 24   BUN mg/dL 28*   CREATININE mg/dL 1 67*   ANION GAP mmol/L 10   CALCIUM mg/dL 8 5   ALBUMIN g/dL 2 8*   TOTAL BILIRUBIN mg/dL 0 80   ALK PHOS U/L 123*   ALT U/L 28   AST U/L 48*   GLUCOSE RANDOM mg/dL 126             Lab Results   Component Value Date/Time    HGBA1C 5 4 03/07/2020 08:43 AM         Results from last 7 days   Lab Units 04/14/22  1126   LACTIC ACID mmol/L 1 0       Imaging: Reviewed radiology reports from this admission including: abdominal/pelvic CT  CT abdomen pelvis w contrast   Final Result by Ab Casey MD (04/14 1317)      Findings consistent with acute cholecystitis    There is a focal hyperdensity near the wall as described, this either represents a stone, or may represent a focal aneurysm of the cystic artery  Consider ultrasound for further evaluation of this would    altered surgical management  I personally discussed this study  with Norberto Kearns on 4/14/2022 at 1:15 PM             Workstation performed: [id number]         XR chest portable   Final Result by Giselle Salas MD (04/14 1200)      No acute cardiopulmonary disease  Workstation performed: [id number]         US right upper quadrant    (Results Pending)       EKG, Pathology, and Other Studies Reviewed on Admission:   Sinus tachycardia with right bundle branch block  ** Please Note: This note may have been constructed using a voice recognition system   **

## 2022-04-15 ENCOUNTER — ANESTHESIA EVENT (INPATIENT)
Dept: PERIOP | Facility: HOSPITAL | Age: 57
DRG: 853 | End: 2022-04-15
Payer: COMMERCIAL

## 2022-04-15 ENCOUNTER — ANESTHESIA (INPATIENT)
Dept: PERIOP | Facility: HOSPITAL | Age: 57
DRG: 853 | End: 2022-04-15
Payer: COMMERCIAL

## 2022-04-15 PROBLEM — E66.9 OBESITY: Status: ACTIVE | Noted: 2022-04-15

## 2022-04-15 LAB
ALBUMIN SERPL BCP-MCNC: 2.3 G/DL (ref 3.5–5)
ALP SERPL-CCNC: 114 U/L (ref 46–116)
ALT SERPL W P-5'-P-CCNC: 36 U/L (ref 12–78)
ANION GAP SERPL CALCULATED.3IONS-SCNC: 11 MMOL/L (ref 4–13)
ANION GAP SERPL CALCULATED.3IONS-SCNC: 8 MMOL/L (ref 4–13)
AST SERPL W P-5'-P-CCNC: 75 U/L (ref 5–45)
BASOPHILS # BLD AUTO: 0.02 THOUSANDS/ΜL (ref 0–0.1)
BASOPHILS NFR BLD AUTO: 0 % (ref 0–1)
BILIRUB SERPL-MCNC: 1.05 MG/DL (ref 0.2–1)
BUN SERPL-MCNC: 19 MG/DL (ref 5–25)
BUN SERPL-MCNC: 23 MG/DL (ref 5–25)
CALCIUM ALBUM COR SERPL-MCNC: 9.2 MG/DL (ref 8.3–10.1)
CALCIUM SERPL-MCNC: 7.6 MG/DL (ref 8.3–10.1)
CALCIUM SERPL-MCNC: 7.8 MG/DL (ref 8.3–10.1)
CHLORIDE SERPL-SCNC: 101 MMOL/L (ref 100–108)
CHLORIDE SERPL-SCNC: 105 MMOL/L (ref 100–108)
CO2 SERPL-SCNC: 23 MMOL/L (ref 21–32)
CO2 SERPL-SCNC: 23 MMOL/L (ref 21–32)
CREAT SERPL-MCNC: 1.2 MG/DL (ref 0.6–1.3)
CREAT SERPL-MCNC: 1.24 MG/DL (ref 0.6–1.3)
EOSINOPHIL # BLD AUTO: 0.03 THOUSAND/ΜL (ref 0–0.61)
EOSINOPHIL NFR BLD AUTO: 0 % (ref 0–6)
ERYTHROCYTE [DISTWIDTH] IN BLOOD BY AUTOMATED COUNT: 14.1 % (ref 11.6–15.1)
GFR SERPL CREATININE-BSD FRML MDRD: 63 ML/MIN/1.73SQ M
GFR SERPL CREATININE-BSD FRML MDRD: 66 ML/MIN/1.73SQ M
GLUCOSE SERPL-MCNC: 122 MG/DL (ref 65–140)
GLUCOSE SERPL-MCNC: 185 MG/DL (ref 65–140)
HCT VFR BLD AUTO: 38 % (ref 36.5–49.3)
HGB BLD-MCNC: 13.5 G/DL (ref 12–17)
IMM GRANULOCYTES # BLD AUTO: 0.05 THOUSAND/UL (ref 0–0.2)
IMM GRANULOCYTES NFR BLD AUTO: 1 % (ref 0–2)
INR PPP: 1.09 (ref 0.84–1.19)
LYMPHOCYTES # BLD AUTO: 0.36 THOUSANDS/ΜL (ref 0.6–4.47)
LYMPHOCYTES NFR BLD AUTO: 4 % (ref 14–44)
MCH RBC QN AUTO: 31.3 PG (ref 26.8–34.3)
MCHC RBC AUTO-ENTMCNC: 35.5 G/DL (ref 31.4–37.4)
MCV RBC AUTO: 88 FL (ref 82–98)
MONOCYTES # BLD AUTO: 0.71 THOUSAND/ΜL (ref 0.17–1.22)
MONOCYTES NFR BLD AUTO: 9 % (ref 4–12)
NEUTROPHILS # BLD AUTO: 7.17 THOUSANDS/ΜL (ref 1.85–7.62)
NEUTS SEG NFR BLD AUTO: 86 % (ref 43–75)
NRBC BLD AUTO-RTO: 0 /100 WBCS
PLATELET # BLD AUTO: 141 THOUSANDS/UL (ref 149–390)
PMV BLD AUTO: 11.7 FL (ref 8.9–12.7)
POTASSIUM SERPL-SCNC: 3.2 MMOL/L (ref 3.5–5.3)
POTASSIUM SERPL-SCNC: 4.5 MMOL/L (ref 3.5–5.3)
PROT SERPL-MCNC: 6.4 G/DL (ref 6.4–8.2)
PROTHROMBIN TIME: 14 SECONDS (ref 11.6–14.5)
RBC # BLD AUTO: 4.31 MILLION/UL (ref 3.88–5.62)
SODIUM SERPL-SCNC: 135 MMOL/L (ref 136–145)
SODIUM SERPL-SCNC: 136 MMOL/L (ref 136–145)
WBC # BLD AUTO: 8.34 THOUSAND/UL (ref 4.31–10.16)

## 2022-04-15 PROCEDURE — 80048 BASIC METABOLIC PNL TOTAL CA: CPT | Performed by: PHYSICIAN ASSISTANT

## 2022-04-15 PROCEDURE — 80053 COMPREHEN METABOLIC PANEL: CPT | Performed by: PHYSICIAN ASSISTANT

## 2022-04-15 PROCEDURE — 47562 LAPAROSCOPIC CHOLECYSTECTOMY: CPT | Performed by: PHYSICIAN ASSISTANT

## 2022-04-15 PROCEDURE — NC001 PR NO CHARGE: Performed by: PHYSICIAN ASSISTANT

## 2022-04-15 PROCEDURE — 85610 PROTHROMBIN TIME: CPT | Performed by: PHYSICIAN ASSISTANT

## 2022-04-15 PROCEDURE — 85025 COMPLETE CBC W/AUTO DIFF WBC: CPT | Performed by: PHYSICIAN ASSISTANT

## 2022-04-15 PROCEDURE — 88304 TISSUE EXAM BY PATHOLOGIST: CPT | Performed by: PATHOLOGY

## 2022-04-15 PROCEDURE — 0FT44ZZ RESECTION OF GALLBLADDER, PERCUTANEOUS ENDOSCOPIC APPROACH: ICD-10-PCS | Performed by: STUDENT IN AN ORGANIZED HEALTH CARE EDUCATION/TRAINING PROGRAM

## 2022-04-15 PROCEDURE — C9113 INJ PANTOPRAZOLE SODIUM, VIA: HCPCS | Performed by: PHYSICIAN ASSISTANT

## 2022-04-15 PROCEDURE — 99232 SBSQ HOSP IP/OBS MODERATE 35: CPT | Performed by: INTERNAL MEDICINE

## 2022-04-15 RX ORDER — MIDAZOLAM HYDROCHLORIDE 2 MG/2ML
INJECTION, SOLUTION INTRAMUSCULAR; INTRAVENOUS AS NEEDED
Status: DISCONTINUED | OUTPATIENT
Start: 2022-04-15 | End: 2022-04-15

## 2022-04-15 RX ORDER — DEXTROSE, SODIUM CHLORIDE, AND POTASSIUM CHLORIDE 5; .9; .15 G/100ML; G/100ML; G/100ML
125 INJECTION INTRAVENOUS CONTINUOUS
Status: DISCONTINUED | OUTPATIENT
Start: 2022-04-15 | End: 2022-04-15

## 2022-04-15 RX ORDER — HYDROMORPHONE HCL/PF 1 MG/ML
0.5 SYRINGE (ML) INJECTION
Status: DISCONTINUED | OUTPATIENT
Start: 2022-04-15 | End: 2022-04-15 | Stop reason: HOSPADM

## 2022-04-15 RX ORDER — LIDOCAINE HYDROCHLORIDE 10 MG/ML
INJECTION, SOLUTION EPIDURAL; INFILTRATION; INTRACAUDAL; PERINEURAL AS NEEDED
Status: DISCONTINUED | OUTPATIENT
Start: 2022-04-15 | End: 2022-04-15

## 2022-04-15 RX ORDER — ONDANSETRON 2 MG/ML
4 INJECTION INTRAMUSCULAR; INTRAVENOUS ONCE
Status: DISCONTINUED | OUTPATIENT
Start: 2022-04-15 | End: 2022-04-15

## 2022-04-15 RX ORDER — DEXMEDETOMIDINE HYDROCHLORIDE 100 UG/ML
INJECTION, SOLUTION INTRAVENOUS AS NEEDED
Status: DISCONTINUED | OUTPATIENT
Start: 2022-04-15 | End: 2022-04-15

## 2022-04-15 RX ORDER — ALPRAZOLAM 0.5 MG/1
1 TABLET ORAL
Status: DISCONTINUED | OUTPATIENT
Start: 2022-04-15 | End: 2022-04-17 | Stop reason: HOSPADM

## 2022-04-15 RX ORDER — FENTANYL CITRATE/PF 50 MCG/ML
50 SYRINGE (ML) INJECTION
Status: DISCONTINUED | OUTPATIENT
Start: 2022-04-15 | End: 2022-04-15 | Stop reason: HOSPADM

## 2022-04-15 RX ORDER — SODIUM CHLORIDE 9 MG/ML
INJECTION, SOLUTION INTRAVENOUS AS NEEDED
Status: DISCONTINUED | OUTPATIENT
Start: 2022-04-15 | End: 2022-04-15 | Stop reason: HOSPADM

## 2022-04-15 RX ORDER — HYDROMORPHONE HCL/PF 1 MG/ML
SYRINGE (ML) INJECTION AS NEEDED
Status: DISCONTINUED | OUTPATIENT
Start: 2022-04-15 | End: 2022-04-15

## 2022-04-15 RX ORDER — SODIUM CHLORIDE, SODIUM LACTATE, POTASSIUM CHLORIDE, CALCIUM CHLORIDE 600; 310; 30; 20 MG/100ML; MG/100ML; MG/100ML; MG/100ML
100 INJECTION, SOLUTION INTRAVENOUS CONTINUOUS
Status: DISCONTINUED | OUTPATIENT
Start: 2022-04-15 | End: 2022-04-16

## 2022-04-15 RX ORDER — PROPOFOL 10 MG/ML
INJECTION, EMULSION INTRAVENOUS AS NEEDED
Status: DISCONTINUED | OUTPATIENT
Start: 2022-04-15 | End: 2022-04-15

## 2022-04-15 RX ORDER — POTASSIUM CHLORIDE 14.9 MG/ML
20 INJECTION INTRAVENOUS
Status: COMPLETED | OUTPATIENT
Start: 2022-04-15 | End: 2022-04-15

## 2022-04-15 RX ORDER — SODIUM CHLORIDE, SODIUM LACTATE, POTASSIUM CHLORIDE, CALCIUM CHLORIDE 600; 310; 30; 20 MG/100ML; MG/100ML; MG/100ML; MG/100ML
INJECTION, SOLUTION INTRAVENOUS CONTINUOUS PRN
Status: DISCONTINUED | OUTPATIENT
Start: 2022-04-15 | End: 2022-04-15

## 2022-04-15 RX ORDER — ONDANSETRON 2 MG/ML
INJECTION INTRAMUSCULAR; INTRAVENOUS AS NEEDED
Status: DISCONTINUED | OUTPATIENT
Start: 2022-04-15 | End: 2022-04-15

## 2022-04-15 RX ORDER — CEFAZOLIN SODIUM 1 G/3ML
INJECTION, POWDER, FOR SOLUTION INTRAMUSCULAR; INTRAVENOUS AS NEEDED
Status: DISCONTINUED | OUTPATIENT
Start: 2022-04-15 | End: 2022-04-15

## 2022-04-15 RX ORDER — DEXAMETHASONE SODIUM PHOSPHATE 10 MG/ML
INJECTION, SOLUTION INTRAMUSCULAR; INTRAVENOUS AS NEEDED
Status: DISCONTINUED | OUTPATIENT
Start: 2022-04-15 | End: 2022-04-15

## 2022-04-15 RX ORDER — ONDANSETRON 2 MG/ML
4 INJECTION INTRAMUSCULAR; INTRAVENOUS ONCE AS NEEDED
Status: DISCONTINUED | OUTPATIENT
Start: 2022-04-15 | End: 2022-04-15 | Stop reason: SDUPTHER

## 2022-04-15 RX ORDER — BUPIVACAINE HYDROCHLORIDE AND EPINEPHRINE 2.5; 5 MG/ML; UG/ML
INJECTION, SOLUTION INFILTRATION; PERINEURAL AS NEEDED
Status: DISCONTINUED | OUTPATIENT
Start: 2022-04-15 | End: 2022-04-15 | Stop reason: HOSPADM

## 2022-04-15 RX ORDER — ROCURONIUM BROMIDE 10 MG/ML
INJECTION, SOLUTION INTRAVENOUS AS NEEDED
Status: DISCONTINUED | OUTPATIENT
Start: 2022-04-15 | End: 2022-04-15

## 2022-04-15 RX ORDER — FENTANYL CITRATE 50 UG/ML
INJECTION, SOLUTION INTRAMUSCULAR; INTRAVENOUS AS NEEDED
Status: DISCONTINUED | OUTPATIENT
Start: 2022-04-15 | End: 2022-04-15

## 2022-04-15 RX ADMIN — SODIUM CHLORIDE, SODIUM LACTATE, POTASSIUM CHLORIDE, AND CALCIUM CHLORIDE: .6; .31; .03; .02 INJECTION, SOLUTION INTRAVENOUS at 15:18

## 2022-04-15 RX ADMIN — PIPERACILLIN AND TAZOBACTAM 3.38 G: 36; 4.5 INJECTION, POWDER, FOR SOLUTION INTRAVENOUS at 16:21

## 2022-04-15 RX ADMIN — ALPRAZOLAM 1 MG: 0.5 TABLET ORAL at 22:30

## 2022-04-15 RX ADMIN — SODIUM CHLORIDE, SODIUM LACTATE, POTASSIUM CHLORIDE, AND CALCIUM CHLORIDE 125 ML/HR: .6; .31; .03; .02 INJECTION, SOLUTION INTRAVENOUS at 17:40

## 2022-04-15 RX ADMIN — PROPOFOL 200 MG: 10 INJECTION, EMULSION INTRAVENOUS at 13:38

## 2022-04-15 RX ADMIN — HYDROMORPHONE HYDROCHLORIDE 0.5 MG: 1 INJECTION, SOLUTION INTRAMUSCULAR; INTRAVENOUS; SUBCUTANEOUS at 22:17

## 2022-04-15 RX ADMIN — FENTANYL CITRATE 50 MCG: 50 INJECTION, SOLUTION INTRAMUSCULAR; INTRAVENOUS at 13:34

## 2022-04-15 RX ADMIN — HYDROMORPHONE HYDROCHLORIDE 0.5 MG: 1 INJECTION, SOLUTION INTRAMUSCULAR; INTRAVENOUS; SUBCUTANEOUS at 14:19

## 2022-04-15 RX ADMIN — ROCURONIUM BROMIDE 20 MG: 10 INJECTION, SOLUTION INTRAVENOUS at 14:15

## 2022-04-15 RX ADMIN — POTASSIUM CHLORIDE 20 MEQ: 14.9 INJECTION, SOLUTION INTRAVENOUS at 10:30

## 2022-04-15 RX ADMIN — ROCURONIUM BROMIDE 20 MG: 10 INJECTION, SOLUTION INTRAVENOUS at 14:01

## 2022-04-15 RX ADMIN — HYDROMORPHONE HYDROCHLORIDE 0.2 MG: 0.2 INJECTION, SOLUTION INTRAMUSCULAR; INTRAVENOUS; SUBCUTANEOUS at 18:39

## 2022-04-15 RX ADMIN — FENTANYL CITRATE 50 MCG: 50 INJECTION, SOLUTION INTRAMUSCULAR; INTRAVENOUS at 13:38

## 2022-04-15 RX ADMIN — PIPERACILLIN AND TAZOBACTAM 3.38 G: 36; 4.5 INJECTION, POWDER, FOR SOLUTION INTRAVENOUS at 05:02

## 2022-04-15 RX ADMIN — ONDANSETRON 4 MG: 2 INJECTION INTRAMUSCULAR; INTRAVENOUS at 15:00

## 2022-04-15 RX ADMIN — AMLODIPINE BESYLATE 10 MG: 10 TABLET ORAL at 08:23

## 2022-04-15 RX ADMIN — ROCURONIUM BROMIDE 10 MG: 10 INJECTION, SOLUTION INTRAVENOUS at 15:25

## 2022-04-15 RX ADMIN — PIPERACILLIN AND TAZOBACTAM 3.38 G: 36; 4.5 INJECTION, POWDER, FOR SOLUTION INTRAVENOUS at 08:22

## 2022-04-15 RX ADMIN — ROCURONIUM BROMIDE 50 MG: 10 INJECTION, SOLUTION INTRAVENOUS at 13:39

## 2022-04-15 RX ADMIN — LIDOCAINE HYDROCHLORIDE 50 MG: 10 INJECTION, SOLUTION EPIDURAL; INFILTRATION; INTRACAUDAL; PERINEURAL at 13:38

## 2022-04-15 RX ADMIN — SODIUM CHLORIDE, SODIUM LACTATE, POTASSIUM CHLORIDE, AND CALCIUM CHLORIDE: .6; .31; .03; .02 INJECTION, SOLUTION INTRAVENOUS at 13:33

## 2022-04-15 RX ADMIN — POTASSIUM CHLORIDE 20 MEQ: 14.9 INJECTION, SOLUTION INTRAVENOUS at 08:23

## 2022-04-15 RX ADMIN — ROCURONIUM BROMIDE 10 MG: 10 INJECTION, SOLUTION INTRAVENOUS at 14:58

## 2022-04-15 RX ADMIN — PIPERACILLIN AND TAZOBACTAM 3.38 G: 36; 4.5 INJECTION, POWDER, FOR SOLUTION INTRAVENOUS at 22:30

## 2022-04-15 RX ADMIN — SUGAMMADEX 260 MG: 100 INJECTION, SOLUTION INTRAVENOUS at 15:52

## 2022-04-15 RX ADMIN — CEFAZOLIN 3000 MG: 330 INJECTION, POWDER, FOR SOLUTION INTRAMUSCULAR; INTRAVENOUS at 13:46

## 2022-04-15 RX ADMIN — MIDAZOLAM HYDROCHLORIDE 2 MG: 1 INJECTION, SOLUTION INTRAMUSCULAR; INTRAVENOUS at 13:34

## 2022-04-15 RX ADMIN — DEXMEDETOMIDINE HCL 12 MCG: 100 INJECTION INTRAVENOUS at 14:32

## 2022-04-15 RX ADMIN — DEXTROSE, SODIUM CHLORIDE, AND POTASSIUM CHLORIDE 125 ML/HR: 5; .9; .15 INJECTION INTRAVENOUS at 08:24

## 2022-04-15 RX ADMIN — DEXAMETHASONE SODIUM PHOSPHATE 8 MG: 10 INJECTION, SOLUTION INTRAMUSCULAR; INTRAVENOUS at 13:50

## 2022-04-15 RX ADMIN — HYDROMORPHONE HYDROCHLORIDE 0.5 MG: 1 INJECTION, SOLUTION INTRAMUSCULAR; INTRAVENOUS; SUBCUTANEOUS at 14:07

## 2022-04-15 RX ADMIN — PANTOPRAZOLE SODIUM 40 MG: 40 INJECTION, POWDER, FOR SOLUTION INTRAVENOUS at 08:23

## 2022-04-15 NOTE — PLAN OF CARE
Problem: PAIN - ADULT  Goal: Verbalizes/displays adequate comfort level or baseline comfort level  Description: Interventions:  - Encourage patient to monitor pain and request assistance  - Assess pain using appropriate pain scale  - Administer analgesics based on type and severity of pain and evaluate response  - Implement non-pharmacological measures as appropriate and evaluate response  - Consider cultural and social influences on pain and pain management  - Notify physician/advanced practitioner if interventions unsuccessful or patient reports new pain  Outcome: Progressing     Problem: INFECTION - ADULT  Goal: Absence or prevention of progression during hospitalization  Description: INTERVENTIONS:  - Assess and monitor for signs and symptoms of infection  - Monitor lab/diagnostic results  - Monitor all insertion sites, i e  indwelling lines, tubes, and drains  - Monitor endotracheal if appropriate and nasal secretions for changes in amount and color  - [place] appropriate cooling/warming therapies per order  - Administer medications as ordered  - Instruct and encourage patient and family to use good hand hygiene technique  - Identify and instruct in appropriate isolation precautions for identified infection/condition  Outcome: Progressing  Goal: Absence of fever/infection during neutropenic period  Description: INTERVENTIONS:  - Monitor WBC    Outcome: Progressing     Problem: SAFETY ADULT  Goal: Patient will remain free of falls  Description: INTERVENTIONS:  - Educate patient/family on patient safety including physical limitations  - Instruct patient to call for assistance with activity   - Consult OT/PT to assist with strengthening/mobility   - Keep Call bell within reach  - Keep bed low and locked with side rails adjusted as appropriate  - Keep care items and personal belongings within reach  - Initiate and maintain comfort rounds  - Make Fall Risk Sign visible to staff  - Offer Toileting every  Hours, in advance of need  - Initiate/Maintain alarm  - Obtain necessary fall risk management equipment:   - Apply yellow socks and bracelet for high fall risk patients  - Consider moving patient to room near nurses station  Outcome: Progressing  Goal: Maintain or return to baseline ADL function  Description: INTERVENTIONS:  -  Assess patient's ability to carry out ADLs; assess patient's baseline for ADL function and identify physical deficits which impact ability to perform ADLs (bathing, care of mouth/teeth, toileting, grooming, dressing, etc )  - Assess/evaluate cause of self-care deficits   - Assess range of motion  - Assess patient's mobility; develop plan if impaired  - Assess patient's need for assistive devices and provide as appropriate  - Encourage maximum independence but intervene and supervise when necessary  - Involve family in performance of ADLs  - Assess for home care needs following discharge   - Consider OT consult to assist with ADL evaluation and planning for discharge  - Provide patient education as appropriate  Outcome: Progressing  Goal: Maintains/Returns to pre admission functional level  Description: INTERVENTIONS:  - Perform BMAT or MOVE assessment daily    - Set and communicate daily mobility goal to care team and patient/family/caregiver  - Collaborate with rehabilitation services on mobility goals if consulted  - Perform Range of Motion  times a day  - Reposition patient every  hours    - Dangle patient  times a day  - Stand patient  times a day  - Ambulate patient  times a day  - Out of bed to chair  times a day   - Out of bed for meal times a day  - Out of bed for toileting  - Record patient progress and toleration of activity level   Outcome: Progressing     Problem: DISCHARGE PLANNING  Goal: Discharge to home or other facility with appropriate resources  Description: INTERVENTIONS:  - Identify barriers to discharge w/patient and caregiver  - Arrange for needed discharge resources and transportation as appropriate  - Identify discharge learning needs (meds, wound care, etc )  - Arrange for interpretive services to assist at discharge as needed  - Refer to Case Management Department for coordinating discharge planning if the patient needs post-hospital services based on physician/advanced practitioner order or complex needs related to functional status, cognitive ability, or social support system  Outcome: Progressing     Problem: Knowledge Deficit  Goal: Patient/family/caregiver demonstrates understanding of disease process, treatment plan, medications, and discharge instructions  Description: Complete learning assessment and assess knowledge base    Interventions:  - Provide teaching at level of understanding  - Provide teaching via preferred learning methods  Outcome: Progressing

## 2022-04-15 NOTE — PLAN OF CARE
Problem: PAIN - ADULT  Goal: Verbalizes/displays adequate comfort level or baseline comfort level  Description: Interventions:  - Encourage patient to monitor pain and request assistance  - Assess pain using appropriate pain scale  - Administer analgesics based on type and severity of pain and evaluate response  - Implement non-pharmacological measures as appropriate and evaluate response  - Consider cultural and social influences on pain and pain management  - Notify physician/advanced practitioner if interventions unsuccessful or patient reports new pain  Outcome: Progressing     Problem: INFECTION - ADULT  Goal: Absence or prevention of progression during hospitalization  Description: INTERVENTIONS:  - Assess and monitor for signs and symptoms of infection  - Monitor lab/diagnostic results  - Monitor all insertion sites, i e  indwelling lines, tubes, and drains  - Monitor endotracheal if appropriate and nasal secretions for changes in amount and color  - [place] appropriate cooling/warming therapies per order  - Administer medications as ordered  - Instruct and encourage patient and family to use good hand hygiene technique  - Identify and instruct in appropriate isolation precautions for identified infection/condition  Outcome: Progressing  Goal: Absence of fever/infection during neutropenic period  Description: INTERVENTIONS:  - Monitor WBC    Outcome: Progressing     Problem: SAFETY ADULT  Goal: Patient will remain free of falls  Description: INTERVENTIONS:  - Educate patient/family on patient safety including physical limitations  - Instruct patient to call for assistance with activity   - Consult OT/PT to assist with strengthening/mobility   - Keep Call bell within reach  - Keep bed low and locked with side rails adjusted as appropriate  - Keep care items and personal belongings within reach  - Initiate and maintain comfort rounds  - Make Fall Risk Sign visible to staff  - Offer Toileting every  Hours, in advance of need  - Initiate/Maintain alarm  - Obtain necessary fall risk management equipment  - Apply yellow socks and bracelet for high fall risk patients  - Consider moving patient to room near nurses station  Outcome: Progressing  Goal: Maintain or return to baseline ADL function  Description: INTERVENTIONS:  -  Assess patient's ability to carry out ADLs; assess patient's baseline for ADL function and identify physical deficits which impact ability to perform ADLs (bathing, care of mouth/teeth, toileting, grooming, dressing, etc )  - Assess/evaluate cause of self-care deficits   - Assess range of motion  - Assess patient's mobility; develop plan if impaired  - Assess patient's need for assistive devices and provide as appropriate  - Encourage maximum independence but intervene and supervise when necessary  - Involve family in performance of ADLs  - Assess for home care needs following discharge   - Consider OT consult to assist with ADL evaluation and planning for discharge  - Provide patient education as appropriate  Outcome: Progressing  Goal: Maintains/Returns to pre admission functional level  Description: INTERVENTIONS:  - Perform BMAT or MOVE assessment daily    - Set and communicate daily mobility goal to care team and patient/family/caregiver  - Collaborate with rehabilitation services on mobility goals if consulted  - Perform Range of Motion  times a day  - Reposition patient every  hours    - Dangle patient  times a day  - Stand patient  times a day  - Ambulate patient  times a day  - Out of bed to chair  times a day   - Out of bed for meals  times a day  - Out of bed for toileting  - Record patient progress and toleration of activity level   Outcome: Progressing     Problem: DISCHARGE PLANNING  Goal: Discharge to home or other facility with appropriate resources  Description: INTERVENTIONS:  - Identify barriers to discharge w/patient and caregiver  - Arrange for needed discharge resources and transportation as appropriate  - Identify discharge learning needs (meds, wound care, etc )  - Arrange for interpretive services to assist at discharge as needed  - Refer to Case Management Department for coordinating discharge planning if the patient needs post-hospital services based on physician/advanced practitioner order or complex needs related to functional status, cognitive ability, or social support system  Outcome: Progressing     Problem: Knowledge Deficit  Goal: Patient/family/caregiver demonstrates understanding of disease process, treatment plan, medications, and discharge instructions  Description: Complete learning assessment and assess knowledge base    Interventions:  - Provide teaching at level of understanding  - Provide teaching via preferred learning methods  Outcome: Progressing

## 2022-04-15 NOTE — UTILIZATION REVIEW
Initial Clinical Review    Admission: Date/Time/Statement:   Admission Orders (From admission, onward)     Ordered        04/14/22 1348  Inpatient Admission  Once                      Orders Placed This Encounter   Procedures    Inpatient Admission     Standing Status:   Standing     Number of Occurrences:   1     Order Specific Question:   Level of Care     Answer:   Med Surg [16]     Order Specific Question:   Estimated length of stay     Answer:   More than 2 Midnights     Order Specific Question:   Certification     Answer:   I certify that inpatient services are medically necessary for this patient for a duration of greater than two midnights  See H&P and MD Progress Notes for additional information about the patient's course of treatment  ED Arrival Information     Expected Arrival Acuity    - 4/14/2022 11:08 Urgent         Means of arrival Escorted by Service Admission type    Walk-In Spouse Surgery-General Urgent         Arrival complaint    SOB, Fever        Chief Complaint   Patient presents with    Shortness of Breath     Over the weekend with fever, aching joints, chills, nausea and diarrhea  Now with increased thirst  Patient states "I don't feel short of breath but I breath heavily"       Initial Presentation: 60 y o  male W/PMHX:s/p Covid, Perry's esophagus, GERD c/w PPI, and HTN hold HCTZ, and lisinopril for now, (pt npo and dehydrated),  to ED from Home, admitted as INPATIENT, due to [address]  Presented with 5 day h/o abdominal pain/n/v    Intermittent fevers, with chills,  Exam: febrile, 102  6, tachycardia, obese, ill appearing, diaphoretic, tenderness RUQ and epigastric region, h/o B/L inguinal hernia repair as a child, ED work up reveals: CT scan c/w acute cholecystitis, diverticulitis on ct  leukocytosis, hyponatremia, hypokalemia, elevated BUN/CR, elevated LFT's, Plan: IV ABX, US RUQ pending, NPO except for meds, Slim consult for SOB, PRN pain and n/v meds, IV ppi, IVF Hydration, scds, I/o, trend for urinary retention, ua pending,     Internal Medicine consult for SOB: Medical Management: Empiric IV ABX, NPO possible OR tomorrow, Hold home HTN meds d/t NPO and dehydration, prn hydralazine IV SBP>180, GERD c/w IV PPI, SCD and SQH,       Date: 4/15/22Day 2: surgical note: US revealed GB wall thickening, w/o stones or sonographic sevilla sign, marked irregularity and pericholecystic fluid seen both on US and CT  Acute Cholecystitis is still highly likely d/t the ct appearance in combo with US  Tmasx 102 6 resolved at this time,  Leukocytosis resolved, obesity BMI 35 56 Plan: OR later today for Laparoscopic  Versus open Cholecystectomy  Replete K+ 40meq IVPB this am, trend K+ level 11am, IVF hydration NSS with 20meq KCL added          Intake/Output Summary (Last 24 hours) at 4/15/2022 0719  Last data filed at 4/14/2022 1621      Gross per 24 hour   Intake 100 ml   Output --   Net 100 ml     OPERATIVE REPORT  SURGERY DATE: 4/15/2022  Preop Diagnosis:  Acute cholecystitis [K81 0]   Post-Op Diagnosis Codes:     * Acute cholecystitis [K81 0]   Procedure(s) (LRB):  CHOLECYSTECTOMY LAPAROSCOPIC , (N/A)   Specimen(s):  ID Type Source Tests Collected by Time Destination   1 :  Tissue Gallbladder TISSUE EXAM Rashmi Ghosh DO 4/15/2022  3:07 PM     Estimated Blood Loss: Minimal   Drains:       Closed/Suction Drain Right RLQ Bulb 19 Fr  (Active)   Dressing Status Clean;Dry; Intact 04/15/22 1535   Drainage Appearance Brown;Bright red 04/15/22 1535   Status To bulb suction 04/15/22 1535   Anesthesia Type: General   Operative Indications:Acute cholecystitis [K81 0]   Operative Findings: Acute gangrenous cholecystitis with abscess and hepatic abscess in between the gallbladder and the liver bed         ED Triage Vitals [04/14/22 1116]   Temperature Pulse Respirations Blood Pressure SpO2   98 1 °F (36 7 °C) (!) 117 (!) 28 (!) 178/89 95 %      Temp Source Heart Rate Source Patient Position - Orthostatic VS BP Location FiO2 (%)   Temporal Monitor -- Left arm --      Pain Score       No Pain          Wt Readings from Last 1 Encounters:   04/15/22 132 kg (292 lb)     Additional Vital Signs:   Date/Time Temp Pulse Resp BP MAP (mmHg) SpO2 O2 Device   04/15/22 1159 99 3 °F (37 4 °C) 94 18 144/75 -- 96 % None (Room air)   04/15/22 0900 -- -- -- -- -- -- None (Room air)   04/15/22 07:21:48 98 5 °F (36 9 °C) 92 18 137/85 102 93 % --   04/14/22 23:32:50 98 5 °F (36 9 °C) 87 18 139/85 103 94 % --   04/14/22 1953 -- -- -- -- -- -- None (Room air)   04/14/22 17:53:15 99 9 °F (37 7 °C) 101 20 156/87 110 94 % --   04/14/22 1719 98 8 °F (37 1 °C) -- -- -- -- -- --   04/14/22 1600 -- 96 23 Abnormal  136/88 101 95 % --   04/14/22 1553 99 2 °F (37 3 °C) 98 24 Abnormal  136/88 -- 96 % None (Room air)   04/14/22 1437 -- 100 20 162/82 -- 93 % None (Room air)   04/14/22 1316 102 6 °F (39 2 °C) Abnormal  -- -- -- -- -- --   04/14/22 1230 -- 101 21 162/82 114 97 % --   04/14/22 1200 -- 105 36 Abnormal  156/80 113 94 % --   04/14/22 1120 -- -- -- -- -- -- None (Room air)   04/14/22 1119 -- -- -- -- -- 95 % --       Pertinent Labs/Diagnostic Test Results:   4/14/22 EKG: Sinus  Tachycardia with RBBB  US right upper quadrant   Final Result by Yadira Louie MD (04/14 1605)      Gallbladder wall thickening without stones or sonographic Cox sign  There is marked irregularity and pericholecystic fluid seen both on ultrasound and CT  Acute cholecystitis is still highly likely   given the CT appearance in combination with the ultrasound  Neoplastic etiology is less likely given no focal thickening with the exception of the small hyperdensity posteriorly  No evidence of aneurysm of the cystic artery is seen on ultrasound; however the study is limited and it is not excluded    Given the absence of any stones 2 account for the focal hyperdensity, early presentation of the rare entity of hemorrhagic    cholecystitis could be considered although usually associated with more intraluminal hyperdensity  Clinical correlation with patient's acuity is advised  This was discussed with Dr Carlyle Murphy at 4:00 PM      Workstation performed: [id number]         CT abdomen pelvis w contrast   Final Result by Jose Saleh MD (04/14 1317)      Findings consistent with acute cholecystitis  There is a focal hyperdensity near the wall as described, this either represents a stone, or may represent a focal aneurysm of the cystic artery  Consider ultrasound for further evaluation of this would    altered surgical management  I personally discussed this study  with Spike Su on 4/14/2022 at 1:15 PM             Workstation performed: [id number]         XR chest portable   Final Result by Génesis Babcock MD (04/14 1200)      No acute cardiopulmonary disease                    Workstation performed: [id number]           Results from last 7 days   Lab Units 04/14/22  1126   SARS-COV-2  Negative     Results from last 7 days   Lab Units 04/15/22  0502 04/14/22  1126   WBC Thousand/uL 8 34 11 79*   HEMOGLOBIN g/dL 13 5 15 7   HEMATOCRIT % 38 0 44 8   PLATELETS Thousands/uL 141* 166   NEUTROS ABS Thousands/µL 7 17 10 25*         Results from last 7 days   Lab Units 04/15/22  1142 04/15/22  0502 04/14/22  1126   SODIUM mmol/L 136 135* 130*   POTASSIUM mmol/L 4 5 3 2* 3 4*   CHLORIDE mmol/L 105 101 96*   CO2 mmol/L 23 23 24   ANION GAP mmol/L 8 11 10   BUN mg/dL 19 23 28*   CREATININE mg/dL 1 24 1 20 1 67*   EGFR ml/min/1 73sq m 63 66 44   CALCIUM mg/dL 7 6* 7 8* 8 5     Results from last 7 days   Lab Units 04/15/22  0502 04/14/22  1126   AST U/L 75* 48*   ALT U/L 36 28   ALK PHOS U/L 114 123*   TOTAL PROTEIN g/dL 6 4 7 5   ALBUMIN g/dL 2 3* 2 8*   TOTAL BILIRUBIN mg/dL 1 05* 0 80         Results from last 7 days   Lab Units 04/15/22  1142 04/15/22  0502 04/14/22  1126   GLUCOSE RANDOM mg/dL 185* 122 126           Results from last 7 days Lab Units 04/15/22  0502   PROTIME seconds 14 0   INR  1 09             Results from last 7 days   Lab Units 04/14/22  1126   LACTIC ACID mmol/L 1 0               Results from last 7 days   Lab Units 04/14/22  1126   LIPASE u/L 142       Results from last 7 days   Lab Units 04/14/22  1551   CLARITY UA  Slightly Cloudy   COLOR UA  Yellow   SPEC GRAV UA  <=1 005   PH UA  6 5   GLUCOSE UA mg/dl Negative   KETONES UA mg/dl Negative   BLOOD UA  Negative   PROTEIN UA mg/dl 30 (1+)*   NITRITE UA  Negative   BILIRUBIN UA  Small*   UROBILINOGEN UA E U /dl 1 0   LEUKOCYTES UA  Negative   WBC UA /hpf 0-1   RBC UA /hpf 0-1   BACTERIA UA /hpf Occasional   EPITHELIAL CELLS WET PREP /hpf Occasional     Results from last 7 days   Lab Units 04/14/22  1126   INFLUENZA A PCR  Negative   INFLUENZA B PCR  Negative   RSV PCR  Negative         ED Treatment:   Medication Administration from 04/14/2022 1104 to 04/14/2022 1748       Date/Time Order Dose Route Action     04/14/2022 1127 sodium chloride 0 9 % bolus 1,000 mL 1,000 mL Intravenous New Bag     04/14/2022 1255 iohexol (OMNIPAQUE) 350 MG/ML injection (SINGLE-DOSE) 100 mL 100 mL Intravenous Given     04/14/2022 1322 acetaminophen (TYLENOL) tablet 650 mg 650 mg Oral Given     04/14/2022 1438 heparin (porcine) subcutaneous injection 5,000 Units 5,000 Units Subcutaneous Given     04/14/2022 1438 pantoprazole (PROTONIX) injection 40 mg 40 mg Intravenous Given     04/14/2022 1438 amLODIPine (NORVASC) tablet 10 mg 10 mg Oral Given     04/14/2022 1451 sodium chloride 0 9 % infusion 125 mL/hr Intravenous New Bag     04/14/2022 1551 piperacillin-tazobactam (ZOSYN) 3 375 g in sodium chloride 0 9 % 100 mL IVPB 3 375 g Intravenous New Bag        Past Medical History:   Diagnosis Date    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    Hypertension      Present on Admission:   Primary hypertension   GERD (gastroesophageal reflux disease)      Admitting Diagnosis: Cholecystitis [K81 9]  SOB (shortness of breath) [R06 02]  Shortness of breath at rest [R06 02]  Age/Sex: 60 y o  male  Admission Orders:NPO, up and oob, I/O, SCD, trend for urinary retention  Scheduled Medications:   amLODIPine, 10 mg, Oral, Daily  cefazolin, 3,000 mg, Intravenous, Once   heparin (porcine), 5,000 Units, Subcutaneous, Q8H LAURIE  pantoprazole, 40 mg, Intravenous, Q24H LAURIE   piperacillin-tazobactam, 3 375 g, Intravenous, Q6H      Continuous IV Infusions:  dextrose 5 % and sodium chloride 0 9 % with KCl 20 mEq/L, 125 mL/hr, Intravenous, Continuous      PRN Meds:   acetaminophen, 650 mg, Oral, Q6H PRN   hydrALAZINE, 10 mg, Intravenous, Q6H PRN 4/15 x2   HYDROmorphone, 0 5 mg, Intravenous, Q4H PRN 4/15 x2    HYDROmorphone, 0 2 mg, Intravenous, Q4H PRN 4/15 x2  ondansetron, 4 mg, Intravenous, Q8H PRN 4/15 x2        IP CONSULT TO INTERNAL MEDICINE    Network Utilization Review Department  ATTENTION: Please call with any questions or concerns to [phone] and carefully listen to the prompts so that you are directed to the right person  All voicemails are confidential   Adelaide Beck all requests for admission clinical reviews, approved or denied determinations and any other requests to dedicated fax number below belonging to the campus where the patient is receiving treatment   List of dedicated fax numbers for the Facilities:  [address] DENIALS (Administrative/Medical Necessity) [phone]   [address] (Maternity/NICU/Pediatrics) [phone]   [address] [place] [address] [address] [place] [address] [address] [address]81 [facility] [phone]   Laverne Salgado [address] [place] [address] [phone]

## 2022-04-15 NOTE — ASSESSMENT & PLAN NOTE
- presents with five-day history of abdominal pain, nausea, and vomiting  - CT scan and right upper quadrant consistent with acute cholecystitis    - status post laparoscopic cholecystectomy on 04/15; intraoperative findings consistent with gangrenous cholecystitis  - continue empiric Zosyn given operative findings and possible perforation  - diet management per General surgery  - supportive care otherwise with IVF and analgesia

## 2022-04-15 NOTE — ANESTHESIA PREPROCEDURE EVALUATION
Procedure:  CHOLECYSTECTOMY LAPAROSCOPIC possible open (N/A Abdomen)    Relevant Problems   CARDIO   (+) Primary hypertension      GI/HEPATIC   (+) GERD (gastroesophageal reflux disease)      Other   (+) Acute cholecystitis   (+) Obesity        Physical Exam    Airway    Mallampati score: II  TM Distance: >3 FB  Neck ROM: full     Dental   No notable dental hx     Cardiovascular      Pulmonary      Other Findings        Anesthesia Plan  ASA Score- 2     Anesthesia Type- general with ASA Monitors  Additional Monitors:   Airway Plan: ETT  Plan Factors-Exercise tolerance (METS): >4 METS  Chart reviewed  EKG reviewed  Existing labs reviewed  Patient summary reviewed  Patient is not a current smoker  Induction- intravenous  Postoperative Plan- Plan for postoperative opioid use  Planned trial extubation    Informed Consent- Anesthetic plan and risks discussed with patient  I personally reviewed this patient with the CRNA  Discussed and agreed on the Anesthesia Plan with the CRNA  Theodore Marcos

## 2022-04-15 NOTE — PROGRESS NOTES
[address]  Progress Note Zia Fell 1965, 60 y o  male MRN: [id number]  Unit/Bed#: -01 Encounter: [id number]  Primary Care Provider: No primary care provider on file  Date and time admitted to hospital: 4/14/2022 11:11 AM    * Acute cholecystitis  Assessment & Plan  - presents with five-day history of abdominal pain, nausea, and vomiting  - CT scan and right upper quadrant consistent with acute cholecystitis    - status post laparoscopic cholecystectomy on 04/15; intraoperative findings consistent with gangrenous cholecystitis  - continue empiric Zosyn given operative findings and possible perforation  - diet management per General surgery  - supportive care otherwise with IVF and analgesia      Primary hypertension  Assessment & Plan  - hold home HCTZ, lasix, lisinopril until patient tolerating more by mouth  - continue home amlodipine with hold parameters  - p r n  hydralazine for SBP greater than 180    Obesity  Assessment & Plan  - affects all levels of care; diet and lifestyle modifications recommended    GERD (gastroesophageal reflux disease)  Assessment & Plan  - continue PPI      VTE Pharmacologic Prophylaxis: VTE Score: 6 High Risk (Score >/= 5) - Pharmacological DVT Prophylaxis Ordered: enoxaparin (Lovenox)  Sequential Compression Devices Ordered  Patient Centered Rounds: I performed bedside rounds with nursing staff today  Discussions with Specialists or Other Care Team Provider: FERNIE  General Surgery  Education and Discussions with Family / Patient: Patient declined call to   Time Spent for Care: 20 minutes  More than 50% of total time spent on counseling and coordination of care as described above      Current Length of Stay: 1 day(s)  Current Patient Status: Inpatient   Certification Statement: The patient will continue to require additional inpatient hospital stay due to postoperative monitoring  Discharge Plan: Anticipate discharge in 48-72 hrs to home  Code Status: Level 1 - Full Code    Subjective:   Patient seen and examined  Following up for acute cholecystitis status post cholecystectomy  Tolerated surgery without complication today, however intraoperative findings more complicated than originally thought  Found to have gangrenous cholecystitis  Nonetheless doing well postoperatively as well  Vital signs stable  He states that his pain is controlled  Objective:     Vitals:   Temp (24hrs), Av 9 °F (37 2 °C), Min:97 5 °F (36 4 °C), Max:99 5 °F (37 5 °C)    Temp:  [97 5 °F (36 4 °C)-99 5 °F (37 5 °C)] 97 5 °F (36 4 °C)  HR:  [83-94] 83  Resp:  [16-24] 17  BP: (134-144)/(75-86) 140/86  SpO2:  [92 %-97 %] 92 %  Body mass index is 35 54 kg/m²  Input and Output Summary (last 24 hours): Intake/Output Summary (Last 24 hours) at 4/15/2022 193  Last data filed at 4/15/2022 1850  Gross per 24 hour   Intake 1700 ml   Output 450 ml   Net 1250 ml       PHYSICAL EXAM:    Vitals signs reviewed  Constitutional   Awake and cooperative  NAD  Head/Neck   Normocephalic  Atraumatic  HEENT   No scleral icterus  EOMI  Heart   Regular rate and rhythm  No murmers  Lungs   Clear to auscultation bilaterally  Respirations unlaboured  Abdomen   Obese  Soft and nondistended  There is some tenderness around the incision sites  There is a KHARI drain in the right upper quadrant with serosanguineous drainage  Skin   Skin color normal  No rashes  Extremities   No deformities  No peripheral edema  Neuro   Alert and oriented  No new deficits  Psych   Mood stable   Affect normal          Additional Data:     Labs:  Results from last 7 days   Lab Units 04/15/22  0502   WBC Thousand/uL 8 34   HEMOGLOBIN g/dL 13 5   HEMATOCRIT % 38 0   PLATELETS Thousands/uL 141*   NEUTROS PCT % 86*   LYMPHS PCT % 4*   MONOS PCT % 9   EOS PCT % 0     Results from last 7 days   Lab Units 04/15/22  1142 04/15/22  0502 04/15/22  0502   SODIUM mmol/L 136   < > 135*   POTASSIUM mmol/L 4 5   < > 3 2*   CHLORIDE mmol/L 105   < > 101   CO2 mmol/L 23   < > 23   BUN mg/dL 19   < > 23   CREATININE mg/dL 1 24   < > 1 20   ANION GAP mmol/L 8   < > 11   CALCIUM mg/dL 7 6*   < > 7 8*   ALBUMIN g/dL  --   --  2 3*   TOTAL BILIRUBIN mg/dL  --   --  1 05*   ALK PHOS U/L  --   --  114   ALT U/L  --   --  36   AST U/L  --   --  75*   GLUCOSE RANDOM mg/dL 185*   < > 122    < > = values in this interval not displayed  Results from last 7 days   Lab Units 04/15/22  0502   INR  1 09             Results from last 7 days   Lab Units 04/14/22  1126   LACTIC ACID mmol/L 1 0       Lines/Drains:  Invasive Devices  Report    Peripheral Intravenous Line            Peripheral IV 04/14/22 Right Antecubital 1 day    Peripheral IV 04/15/22 Dorsal (posterior); Left Wrist <1 day          Drain            Closed/Suction Drain Right RLQ Bulb 19 Fr  <1 day                      Imaging: No pertinent imaging reviewed      Recent Cultures (last 7 days):         Last 24 Hours Medication List:   Current Facility-Administered Medications   Medication Dose Route Frequency Provider Last Rate    acetaminophen  650 mg Oral Q6H PRN Kwame Lopez DO      ALPRAZolam  1 mg Oral HS PRN Kwame Lopez DO      amLODIPine  10 mg Oral Daily Wendi Ax [place], DO      cefazolin  3,000 mg Intravenous Once Wendi Ax Rony, DO      dextrose 5 % and sodium chloride 0 9 % with KCl 20 mEq/L  125 mL/hr Intravenous Continuous Kwame Lopez DO Stopped (04/15/22 1913)    [START ON 4/16/2022] enoxaparin  40 mg Subcutaneous Q24H [address] Kwame Lopez DO      fentaNYL  50 mcg Intravenous O35 Min PRN Mary Jane [place] MD      hydrALAZINE  10 mg Intravenous Q6H PRN Kwame Lopez DO      HYDROmorphone  0 5 mg Intravenous Q4H PRN Kwame Lopez DO      HYDROmorphone  0 5 mg Intravenous C09 Min PRN Mary Jane [place] MD      HYDROmorphone  0 2 mg Intravenous Q4H PRN Diandra Aguilar DO      lactated ringers  125 mL/hr Intravenous Continuous Jewel Romberg, CRNA 125 mL/hr (04/15/22 1740)    ondansetron  4 mg Intravenous Q8H PRN Diandra Aguilar DO      ondansetron  4 mg Intravenous Once PRN Bimal Maldonado MD      pantoprazole  40 mg Intravenous Q24H [facility] [place], [place]      piperacillin-tazobactam  3 375 g Intravenous Q6H Vasyl Speed DO [place] 3 375 g (04/15/22 1621)        Today, Patient Was Seen By: Jamel Fairbanks DO    **Please Note: This note may have been constructed using a voice recognition system  **

## 2022-04-15 NOTE — ASSESSMENT & PLAN NOTE
- hold home HCTZ, lasix, lisinopril until patient tolerating more by mouth  - continue home amlodipine with hold parameters  - p r n  hydralazine for SBP greater than 180

## 2022-04-15 NOTE — OP NOTE
OPERATIVE REPORT  PATIENT NAME: Finesse Reagan    :  1965  MRN: [id number]  Pt Location: [facility] OR ROOM 01    SURGERY DATE: 4/15/2022    Surgeon(s) and Role:     * Chepe Cabrales DO - Primary     * Catherine Courtney PA-C - Assisting    Preop Diagnosis:  Acute cholecystitis [K81 0]    Post-Op Diagnosis Codes:     * Acute cholecystitis [K81 0]    Procedure(s) (LRB):  CHOLECYSTECTOMY LAPAROSCOPIC , (N/A)    Specimen(s):  ID Type Source Tests Collected by Time Destination   1 :  Tissue Gallbladder TISSUE EXAM Conception DO Keron 4/15/2022  3:07 PM        Estimated Blood Loss:   Minimal    Drains:  Closed/Suction Drain Right RLQ Bulb 19 Fr  (Active)   Dressing Status Clean;Dry; Intact 04/15/22 1535   Drainage Appearance Brown;Bright red 04/15/22 1535   Status To bulb suction 04/15/22 1535   Number of days: 0       Anesthesia Type:   General    Operative Indications:  Acute cholecystitis [K81 0]      Operative Findings:  Acute gangrenous cholecystitis with abscess and hepatic abscess in between the gallbladder and the liver bed    Complications:   None    Procedure and Technique:  The patient was brought to the operating room  A time-out was held and the patient identified and procedure verified  Appropriate prophylaxis and DVT prophylaxis was used  General anesthesia was administered  The area of the abdomen is prepped and draped in the usual sterile fashion  Local anesthesia using 0 25% Marcaine with epinephrine was infiltrated in the supraumbilical area  A small incision was made using 11 blade scalpel  The skin was grasped and elevated using towel clamps  A Veress needle was placed and confirmed to be intraperitoneal using a saline drop test   The abdomen was insufflated to 15 mmHg intraperitoneal pressure  A 5 mm trocar was placed  The abdomen was inspected and found to be free of adhesions    An additional 11 mm trocar was placed in the epigastric area this was done after infiltration of local anesthesia and under direct visualization  Two additional 5 mm trocars were placed in the right upper quadrant in the same fashion  The patient was placed in reverse Trendelenburg position and rotated towards the left side  The omentum was covering the gallbladder  Blunt dissection as well as electrocautery were used to free adhesions of the omentum to the liver  This revealed an acutely inflamed tense gallbladder  The Veress needle was introduced into the right upper quadrant  Under direct visualization the Veress was advanced into the fundus of the gallbladder  Approximately 100 mL of purulent bilious drainage was aspirated from the gallbladder  The fundus of the gallbladder was grasped and elevated anteriorly and superiorly  Any adhesions to the gallbladder were taken down using blunt dissection and electrocautery  During this dissection multiple areas of purulent drainage emanating from the area surrounding the gallbladder were encountered consistent with hepatic abscesses  Sun's pouch was identified  Multiple attempts were made to dissect the area of Sun's pouch however, the tissue was mostly necrotic in this area  At this point it was decided to leave a small portion of Sun's pouch intact in order to identify the cystic duct  Dissection was able to be completed circumferentially around the gallbladder in this area  The Harmonic scissor was used to divide the area of Sun's pouch free from the remainder of the gallbladder  Any further drainage from the gallbladder was suctioned until clean  Two endo-loops were then able to be placed at the base of Sun's pouch in the area of the cystic duct  The Harmonic scissor was then used to free the gallbladder from the hepatic fossa  Again during this dissection multiple abscesses were found located between the posterior gallbladder wall and the hepatic fossa and infiltrating into the hepatic fossa    The gallbladder was placed within a 10 mm Endo-Catch bag and removed through the epigastric port site  The hepatic fossa was inspected  Hemostasis was achieved using electrocautery  The right upper quadrant was irrigated and suctioned until clean  A 19 [place] Donna Brigido drain was introduced into the lateral right upper quadrant port  The drain was placed inferior to the liver in the area of the hepatic fossa  The drain was secured in place using a suture of 3-0 nylon  Fascia of the 11 mm trocar site was closed using a suture of 0 Vicryl with the laparoscopic suture Passer  All trocars were removed and the abdomen was desufflated  Skin was closed using 4 0 Vicryl in the subcuticular layer  All incisions were covered with skin glue  The patient tolerated the procedure well was transferred to recovery in stable condition  At the end the procedure all needle instrument sponge counts were correct x2     I was present for the entire procedure and A physician assistant was required during the procedure for retraction tissue handling,dissection and suturing    Patient Disposition:  PACU       SIGNATURE: Rush Gillis DO  DATE: April 15, 2022  TIME: 3:54 PM

## 2022-04-15 NOTE — ANESTHESIA POSTPROCEDURE EVALUATION
Post-Op Assessment Note    CV Status:  Stable    Pain management: satisfactory to patient     Mental Status:  Alert and awake   Hydration Status:  Stable   PONV Controlled:  None   Airway Patency:  Patent  Airway: intubated      Post Op Vitals Reviewed: Yes      Staff: CRNA         No complications documented      /82 (04/15/22 1611)    Temp 99 5 °F (37 5 °C) (04/15/22 1611)    Pulse 92 (04/15/22 1611)   Resp 16 (04/15/22 1611)    SpO2 97 % (04/15/22 1611)

## 2022-04-15 NOTE — PROGRESS NOTES
Progress Note - General Surgery   Jodi Re 60 y o  male MRN: [id number]  Unit/Bed#: -01 Encounter: [id number]    Assessment:  - CT findings consistent with cholecystitis  - US revealed the same  - Hiatal hernia  - Hx of Perry's esophagus  - C/O short of breath, o2 SAT 94% on RA, chest XR without abnormality, CT with trace right pleural effusion  - Tmax 102 6 in ED, afebrile since that time  - Leukocytosis resolved 8 34 (11 79)  - Lipase 142  - Tbili 1 05 (0 80)  - PRICILLA improving, Cr 1 20 (1 67), likely dehydration  - Hx of HTN  - Diverticulosis found on CT  - Hx of bilateral inguinal hernias as a child  - Obesity, BMI 35 56  - COVID negative at this time, positive several months ago      Plan:  NPO   OR later today for laparoscopic versus open cholecystectomy  Replenish K+ (40mEq IVPB ordered this AM)  Check K+ at 11am  Medicate PRN pain/nausea  IV PPI  IVF hydration, NS w K+ 20 mEq now ordered  Monitor I/Os  Heparin anticoagulation  SCD's  Urinary retention protocol  Follow qAM CBC and CMP  UA pending  Appreciate SLIM recs for comorbidities      Subjective/Objective     Subjective: States that he was uncomfortable overnight  Mild RUQ and epigastric discomfort  Mild nausea  Denies vomiting  Denies subjective fevers  No chest pain  No short of breath overnight  Objective:   Blood pressure 139/85, pulse 87, temperature 98 5 °F (36 9 °C), resp  rate 18, height 6' 4" (1 93 m), weight 132 kg (292 lb 1 8 oz), SpO2 94 %  ,Body mass index is 35 56 kg/m²  Intake/Output Summary (Last 24 hours) at 4/15/2022 0719  Last data filed at 4/14/2022 1621  Gross per 24 hour   Intake 100 ml   Output --   Net 100 ml       Invasive Devices  Report    Peripheral Intravenous Line            Peripheral IV 04/14/22 Right Antecubital <1 day    Peripheral IV 04/15/22 Dorsal (posterior); Left Wrist <1 day                Physical Exam:   Gen: AxOx3  Heart: RRR  Lungs: CTA  Abd: obese, soft, there is epigastric and RUQ tenderness on palpation  Non distended  No guarding or rebound  Lab, Imaging and other studies:  I have personally reviewed pertinent lab results      CBC:   Lab Results   Component Value Date    WBC 8 34 04/15/2022    HGB 13 5 04/15/2022    HCT 38 0 04/15/2022    MCV 88 04/15/2022     (L) 04/15/2022    MCH 31 3 04/15/2022    MCHC 35 5 04/15/2022    RDW 14 1 04/15/2022    MPV 11 7 04/15/2022    NRBC 0 04/15/2022     CMP:   Lab Results   Component Value Date    SODIUM 135 (L) 04/15/2022    K 3 2 (L) 04/15/2022     04/15/2022    CO2 23 04/15/2022    BUN 23 04/15/2022    CREATININE 1 20 04/15/2022    CALCIUM 7 8 (L) 04/15/2022    AST 75 (H) 04/15/2022    ALT 36 04/15/2022    ALKPHOS 114 04/15/2022    EGFR 66 04/15/2022     Coagulation:   Lab Results   Component Value Date    INR 1 09 04/15/2022     VTE Pharmacologic Prophylaxis: Heparin  VTE Mechanical Prophylaxis: sequential compression device    Yuri Guillory PA-C

## 2022-04-16 LAB
ALBUMIN SERPL BCP-MCNC: 2.2 G/DL (ref 3.5–5)
ALP SERPL-CCNC: 114 U/L (ref 46–116)
ALT SERPL W P-5'-P-CCNC: 53 U/L (ref 12–78)
ANION GAP SERPL CALCULATED.3IONS-SCNC: 6 MMOL/L (ref 4–13)
AST SERPL W P-5'-P-CCNC: 87 U/L (ref 5–45)
BASOPHILS # BLD AUTO: 0.01 THOUSANDS/ΜL (ref 0–0.1)
BASOPHILS NFR BLD AUTO: 0 % (ref 0–1)
BILIRUB SERPL-MCNC: 0.69 MG/DL (ref 0.2–1)
BUN SERPL-MCNC: 16 MG/DL (ref 5–25)
CALCIUM ALBUM COR SERPL-MCNC: 9.4 MG/DL (ref 8.3–10.1)
CALCIUM SERPL-MCNC: 8 MG/DL (ref 8.3–10.1)
CHLORIDE SERPL-SCNC: 103 MMOL/L (ref 100–108)
CO2 SERPL-SCNC: 27 MMOL/L (ref 21–32)
CREAT SERPL-MCNC: 1.05 MG/DL (ref 0.6–1.3)
EOSINOPHIL # BLD AUTO: 0 THOUSAND/ΜL (ref 0–0.61)
EOSINOPHIL NFR BLD AUTO: 0 % (ref 0–6)
ERYTHROCYTE [DISTWIDTH] IN BLOOD BY AUTOMATED COUNT: 14.8 % (ref 11.6–15.1)
GFR SERPL CREATININE-BSD FRML MDRD: 77 ML/MIN/1.73SQ M
GLUCOSE SERPL-MCNC: 184 MG/DL (ref 65–140)
HCT VFR BLD AUTO: 38.6 % (ref 36.5–49.3)
HGB BLD-MCNC: 13.4 G/DL (ref 12–17)
IMM GRANULOCYTES # BLD AUTO: 0.1 THOUSAND/UL (ref 0–0.2)
IMM GRANULOCYTES NFR BLD AUTO: 1 % (ref 0–2)
LYMPHOCYTES # BLD AUTO: 0.4 THOUSANDS/ΜL (ref 0.6–4.47)
LYMPHOCYTES NFR BLD AUTO: 6 % (ref 14–44)
MCH RBC QN AUTO: 31.5 PG (ref 26.8–34.3)
MCHC RBC AUTO-ENTMCNC: 34.7 G/DL (ref 31.4–37.4)
MCV RBC AUTO: 91 FL (ref 82–98)
MONOCYTES # BLD AUTO: 0.39 THOUSAND/ΜL (ref 0.17–1.22)
MONOCYTES NFR BLD AUTO: 6 % (ref 4–12)
NEUTROPHILS # BLD AUTO: 6.23 THOUSANDS/ΜL (ref 1.85–7.62)
NEUTS SEG NFR BLD AUTO: 87 % (ref 43–75)
NRBC BLD AUTO-RTO: 0 /100 WBCS
PLATELET # BLD AUTO: 176 THOUSANDS/UL (ref 149–390)
PMV BLD AUTO: 11.1 FL (ref 8.9–12.7)
POTASSIUM SERPL-SCNC: 3.6 MMOL/L (ref 3.5–5.3)
PROT SERPL-MCNC: 6.4 G/DL (ref 6.4–8.2)
RBC # BLD AUTO: 4.25 MILLION/UL (ref 3.88–5.62)
SODIUM SERPL-SCNC: 136 MMOL/L (ref 136–145)
WBC # BLD AUTO: 7.13 THOUSAND/UL (ref 4.31–10.16)

## 2022-04-16 PROCEDURE — 99232 SBSQ HOSP IP/OBS MODERATE 35: CPT | Performed by: INTERNAL MEDICINE

## 2022-04-16 PROCEDURE — 80053 COMPREHEN METABOLIC PANEL: CPT | Performed by: STUDENT IN AN ORGANIZED HEALTH CARE EDUCATION/TRAINING PROGRAM

## 2022-04-16 PROCEDURE — C9113 INJ PANTOPRAZOLE SODIUM, VIA: HCPCS | Performed by: STUDENT IN AN ORGANIZED HEALTH CARE EDUCATION/TRAINING PROGRAM

## 2022-04-16 PROCEDURE — 85025 COMPLETE CBC W/AUTO DIFF WBC: CPT | Performed by: STUDENT IN AN ORGANIZED HEALTH CARE EDUCATION/TRAINING PROGRAM

## 2022-04-16 RX ORDER — IBUPROFEN 600 MG/1
600 TABLET ORAL EVERY 6 HOURS PRN
Status: DISCONTINUED | OUTPATIENT
Start: 2022-04-16 | End: 2022-04-17 | Stop reason: HOSPADM

## 2022-04-16 RX ADMIN — PIPERACILLIN AND TAZOBACTAM 3.38 G: 36; 4.5 INJECTION, POWDER, FOR SOLUTION INTRAVENOUS at 16:56

## 2022-04-16 RX ADMIN — HYDROMORPHONE HYDROCHLORIDE 0.5 MG: 1 INJECTION, SOLUTION INTRAMUSCULAR; INTRAVENOUS; SUBCUTANEOUS at 03:30

## 2022-04-16 RX ADMIN — PANTOPRAZOLE SODIUM 40 MG: 40 INJECTION, POWDER, FOR SOLUTION INTRAVENOUS at 10:00

## 2022-04-16 RX ADMIN — IBUPROFEN 600 MG: 600 TABLET ORAL at 15:04

## 2022-04-16 RX ADMIN — ALPRAZOLAM 1 MG: 0.5 TABLET ORAL at 21:25

## 2022-04-16 RX ADMIN — PIPERACILLIN AND TAZOBACTAM 3.38 G: 36; 4.5 INJECTION, POWDER, FOR SOLUTION INTRAVENOUS at 03:40

## 2022-04-16 RX ADMIN — PIPERACILLIN AND TAZOBACTAM 3.38 G: 36; 4.5 INJECTION, POWDER, FOR SOLUTION INTRAVENOUS at 11:00

## 2022-04-16 RX ADMIN — AMLODIPINE BESYLATE 10 MG: 10 TABLET ORAL at 10:00

## 2022-04-16 RX ADMIN — SODIUM CHLORIDE, SODIUM LACTATE, POTASSIUM CHLORIDE, AND CALCIUM CHLORIDE 100 ML/HR: .6; .31; .03; .02 INJECTION, SOLUTION INTRAVENOUS at 03:40

## 2022-04-16 RX ADMIN — PIPERACILLIN AND TAZOBACTAM 3.38 G: 36; 4.5 INJECTION, POWDER, FOR SOLUTION INTRAVENOUS at 21:25

## 2022-04-16 RX ADMIN — ACETAMINOPHEN 325MG 650 MG: 325 TABLET ORAL at 10:06

## 2022-04-16 RX ADMIN — ENOXAPARIN SODIUM 40 MG: 40 INJECTION SUBCUTANEOUS at 10:00

## 2022-04-16 NOTE — ASSESSMENT & PLAN NOTE
- okay to resume all home meds on discharge Chief complaint:   Chief Complaint   Patient presents with   â¢ Cough     N--4   â¢ Congestion       Vitals:  Visit Vitals  /60   Pulse 114   Temp 101.5 Â°F (38.6 Â°C) (Oral)   Resp 20   Wt 29 kg   SpO2 98%       HISTORY OF PRESENT ILLNESS     HPI     Patient is a 4 yo male who presents with rhinorrhea, nasal congestion, cough, sore throat and fever x 2-3 days. Fever up to 101 F at home. Appetite has been mildly decreased. Normal urination. Motrin PRN, none today. Denies vomiting, diarrhea, abdominal pain, or ear pain. Brother is here to be evaluated for similar symptoms. Other significant problems:  Patient Active Problem List    Diagnosis Date Noted   â¢ No known problems 03/28/2013     Priority: Low       PAST MEDICAL, FAMILY AND SOCIAL HISTORY     Medications:  Current Outpatient Prescriptions   Medication   â¢ ibuprofen (MOTRIN) 100 MG/5ML suspension     No current facility-administered medications for this visit. Allergies:  ALLERGIES:  No Known Allergies    Past Medical  History/Surgeries:  History reviewed. No pertinent past medical history. History reviewed. No pertinent surgical history. Family History:  Family History   Problem Relation Age of Onset   â¢ Cancer Maternal Grandmother      Hodgkin's in 35s, breast cancer at age 36, metastatsized to liver   â¢ Cancer Paternal Grandmother 58     breast       Social History:  Social History   Substance Use Topics   â¢ Smoking status: Not on file   â¢ Smokeless tobacco: Not on file   â¢ Alcohol use Not on file       REVIEW OF SYSTEMS     Review of Systems   All other systems reviewed and are negative. PHYSICAL EXAM     Physical Exam   Constitutional: He appears well-developed and well-nourished. He is active and cooperative. Non-toxic appearance. He does not have a sickly appearance. He does not appear ill. No distress.    HENT:   Right Ear: Tympanic membrane, external ear and canal normal.   Left Ear: Tympanic membrane, external ear and canal normal.   Nose: Rhinorrhea and congestion present. Mouth/Throat: Mucous membranes are moist. Pharynx erythema present. No oropharyngeal exudate or pharynx swelling. No tonsillar exudate. Eyes: Conjunctivae are normal.   Cardiovascular: Normal rate and regular rhythm. Pulmonary/Chest: Effort normal and breath sounds normal. There is normal air entry. No nasal flaring or stridor. No respiratory distress. Air movement is not decreased. He has no decreased breath sounds. He has no wheezes. He has no rhonchi. He has no rales. Neurological: He is alert. Skin: Skin is warm and dry. He is not diaphoretic. Vitals reviewed. ASSESSMENT/PLAN     -Influenza like illness   -Rapid strep was negative.   -Suspect influenza. Patient does not warrant treatment so will not be testing. This was discussed with father who noted understanding.   -Tylenol and/or ibuprofen PRN for pain and/or fever  -Warm steam from hot shower and/or cool mist humidifier PRN for symptom relief.   -Drink plenty of fluids and get plenty of rest.  -F/U immediately in ER if patient is vomiting, has troubles breathing, or any new/worsening symptoms.   -Discussed what would prompt follow-up and what would prompt immediate evaluation. Discussed verbally in the room and given in writing. Â   -Patient's father agreed with plan and voiced understanding. Â   -All questions were answered. -F/U per after visit summary. Patient was seen under the supervision of Dr. King Kohler.

## 2022-04-16 NOTE — PROGRESS NOTES
[address]  Progress Note Daphine Space 1965, 60 y o  male MRN: [id number]  Unit/Bed#: [facility]-01 Encounter: [id number]  Primary Care Provider: No primary care provider on file  Date and time admitted to hospital: 4/14/2022 11:11 AM    * Acute cholecystitis  Assessment & Plan  - presents with five-day history of abdominal pain, nausea, and vomiting  - CT scan and right upper quadrant consistent with acute cholecystitis    - status post laparoscopic cholecystectomy on 04/15; intraoperative findings consistent with gangrenous cholecystitis  - continue empiric Zosyn given operative findings   - diet management per General surgery      Primary hypertension  Assessment & Plan  - hold home HCTZ, lasix, lisinopril for now; resume on discharge  - continue home amlodipine with hold parameters  - p r n  hydralazine for SBP greater than 180    Obesity  Assessment & Plan  - affects all levels of care; diet and lifestyle modifications recommended    GERD (gastroesophageal reflux disease)  Assessment & Plan  - continue PPI    VTE Pharmacologic Prophylaxis: VTE Score: 6 High Risk (Score >/= 5) - Pharmacological DVT Prophylaxis Ordered: enoxaparin (Lovenox)  Sequential Compression Devices Ordered  Patient Centered Rounds: I performed bedside rounds with nursing staff today  Discussions with Specialists or Other Care Team Provider: FERNIE  General Surgery  Education and Discussions with Family / Patient: Patient declined call to   Time Spent for Care: 20 minutes  More than 50% of total time spent on counseling and coordination of care as described above  Current Length of Stay: 2 day(s)  Current Patient Status: Inpatient   Certification Statement: The patient will continue to require additional inpatient hospital stay due to postoperative monitoring  Discharge Plan: Anticipate discharge in 24-48 hrs to home      Code Status: Level 1 - Full Code    Subjective:   Patient seen and examined  Following up for acute cholecystitis status post cholecystectomy  Doing well today  Tolerating diet  Pain is controlled  Afebrile  Objective:     Vitals:   Temp (24hrs), Av 5 °F (36 9 °C), Min:97 5 °F (36 4 °C), Max:99 5 °F (37 5 °C)    Temp:  [97 5 °F (36 4 °C)-99 5 °F (37 5 °C)] 97 6 °F (36 4 °C)  HR:  [67-92] 67  Resp:  [16-24] 18  BP: (134-140)/(78-86) 138/85  SpO2:  [91 %-97 %] 95 %  Body mass index is 35 54 kg/m²  Input and Output Summary (last 24 hours): Intake/Output Summary (Last 24 hours) at 2022 1315  Last data filed at 2022 0900  Gross per 24 hour   Intake 2180 ml   Output 1310 ml   Net 870 ml       PHYSICAL EXAM:    Vitals signs reviewed  Constitutional   Awake and cooperative  NAD  Head/Neck   Normocephalic  Atraumatic  HEENT   No scleral icterus  EOMI  Heart   Regular rate and rhythm  No murmers  Lungs   Clear to auscultation bilaterally  Respirations unlaboured  Abdomen   Obese  Soft and nondistended  Mild tenderness at incision sites  There is a KHARI drain in the right upper quadrant with serosanguineous drainage  Skin   Skin color normal  No rashes  Extremities   No deformities  No peripheral edema  Neuro   Alert and oriented  No new deficits  Psych   Mood stable   Affect normal          Additional Data:     Labs:  Results from last 7 days   Lab Units 22  0507   WBC Thousand/uL 7 13   HEMOGLOBIN g/dL 13 4   HEMATOCRIT % 38 6   PLATELETS Thousands/uL 176   NEUTROS PCT % 87*   LYMPHS PCT % 6*   MONOS PCT % 6   EOS PCT % 0     Results from last 7 days   Lab Units 22  0507   SODIUM mmol/L 136   POTASSIUM mmol/L 3 6   CHLORIDE mmol/L 103   CO2 mmol/L 27   BUN mg/dL 16   CREATININE mg/dL 1 05   ANION GAP mmol/L 6   CALCIUM mg/dL 8 0*   ALBUMIN g/dL 2 2*   TOTAL BILIRUBIN mg/dL 0 69   ALK PHOS U/L 114   ALT U/L 53   AST U/L 87*   GLUCOSE RANDOM mg/dL 184*     Results from last 7 days   Lab Units 04/15/22  0502   INR  1 09 Results from last 7 days   Lab Units 04/14/22  1126   LACTIC ACID mmol/L 1 0       Lines/Drains:  Invasive Devices  Report    Peripheral Intravenous Line            Peripheral IV 04/15/22 Dorsal (posterior); Left Wrist 1 day          Drain            Closed/Suction Drain Right RLQ Bulb 19 Fr  <1 day                      Imaging: No pertinent imaging reviewed  Recent Cultures (last 7 days):         Last 24 Hours Medication List:   Current Facility-Administered Medications   Medication Dose Route Frequency Provider Last Rate    acetaminophen  650 mg Oral Q6H PRN Shabbir Edson, DO      ALPRAZolam  1 mg Oral HS PRN Shabbir Edson, DO      amLODIPine  10 mg Oral Daily Ival Vangie Kimble, DO      cefazolin  3,000 mg Intravenous Once Ival Vangie Rony, DO      enoxaparin  40 mg Subcutaneous Q24H [address] Shabbir Sandhu, [place]      hydrALAZINE  10 mg Intravenous Q6H PRN Ival Vangie Kimble, DO      HYDROmorphone  0 5 mg Intravenous Q4H PRN Shabbir Jules, DO      ibuprofen  600 mg Oral Q6H PRN Shabbir Edson, DO      ondansetron  4 mg Intravenous Q8H PRN Ival Vangie Rony, DO      pantoprazole  40 mg Intravenous Q24H [address] [facility]angie Kimble[facility]      piperacillin-tazobactam  3 375 g Intravenous Q6H Ival Vangie Kimble, DO 3 375 g (04/16/22 1100)        Today, Patient Was Seen By: Sarah Oh DO    **Please Note: This note may have been constructed using a voice recognition system  **

## 2022-04-16 NOTE — PLAN OF CARE
Problem: PAIN - ADULT  Goal: Verbalizes/displays adequate comfort level or baseline comfort level  Description: Interventions:  - Encourage patient to monitor pain and request assistance  - Assess pain using appropriate pain scale  - Administer analgesics based on type and severity of pain and evaluate response  - Implement non-pharmacological measures as appropriate and evaluate response  - Consider cultural and social influences on pain and pain management  - Notify physician/advanced practitioner if interventions unsuccessful or patient reports new pain  Outcome: Progressing     Problem: INFECTION - ADULT  Goal: Absence or prevention of progression during hospitalization  Description: INTERVENTIONS:  - Assess and monitor for signs and symptoms of infection  - Monitor lab/diagnostic results  - Monitor all insertion sites, i e  indwelling lines, tubes, and drains  - Monitor endotracheal if appropriate and nasal secretions for changes in amount and color  - [place] appropriate cooling/warming therapies per order  - Administer medications as ordered  - Instruct and encourage patient and family to use good hand hygiene technique  - Identify and instruct in appropriate isolation precautions for identified infection/condition  Outcome: Progressing  Goal: Absence of fever/infection during neutropenic period  Description: INTERVENTIONS:  - Monitor WBC    Outcome: Progressing     Problem: SAFETY ADULT  Goal: Patient will remain free of falls  Description: INTERVENTIONS:  - Educate patient/family on patient safety including physical limitations  - Instruct patient to call for assistance with activity   - Consult OT/PT to assist with strengthening/mobility   - Keep Call bell within reach  - Keep bed low and locked with side rails adjusted as appropriate  - Keep care items and personal belongings within reach  - Initiate and maintain comfort rounds  - Make Fall Risk Sign visible to staff  - Offer Toileting every 2 Hours, in advance of need  - Initiate/Maintain bedalarm  - Obtain necessary fall risk management equipment  - Apply yellow socks and bracelet for high fall risk patients  - Consider moving patient to room near nurses station  Outcome: Progressing  Goal: Maintain or return to baseline ADL function  Description: INTERVENTIONS:  -  Assess patient's ability to carry out ADLs; assess patient's baseline for ADL function and identify physical deficits which impact ability to perform ADLs (bathing, care of mouth/teeth, toileting, grooming, dressing, etc )  - Assess/evaluate cause of self-care deficits   - Assess range of motion  - Assess patient's mobility; develop plan if impaired  - Assess patient's need for assistive devices and provide as appropriate  - Encourage maximum independence but intervene and supervise when necessary  - Involve family in performance of ADLs  - Assess for home care needs following discharge   - Consider OT consult to assist with ADL evaluation and planning for discharge  - Provide patient education as appropriate  Outcome: Progressing  Goal: Maintains/Returns to pre admission functional level  Description: INTERVENTIONS:  - Perform BMAT or MOVE assessment daily    - Set and communicate daily mobility goal to care team and patient/family/caregiver  - Collaborate with rehabilitation services on mobility goals if consulted  - Perform Range of Motion 3 times a day  - Reposition patient every 2 hours    - Dangle patient 3 times a day  - Stand patient 3 times a day  - Ambulate patient 3 times a day  - Out of bed to chair 3 times a day   - Out of bed for meals 3 times a day  - Out of bed for toileting  - Record patient progress and toleration of activity level   Outcome: Progressing     Problem: DISCHARGE PLANNING  Goal: Discharge to home or other facility with appropriate resources  Description: INTERVENTIONS:  - Identify barriers to discharge w/patient and caregiver  - Arrange for needed discharge resources and transportation as appropriate  - Identify discharge learning needs (meds, wound care, etc )  - Arrange for interpretive services to assist at discharge as needed  - Refer to Case Management Department for coordinating discharge planning if the patient needs post-hospital services based on physician/advanced practitioner order or complex needs related to functional status, cognitive ability, or social support system  Outcome: Progressing     Problem: Knowledge Deficit  Goal: Patient/family/caregiver demonstrates understanding of disease process, treatment plan, medications, and discharge instructions  Description: Complete learning assessment and assess knowledge base    Interventions:  - Provide teaching at level of understanding  - Provide teaching via preferred learning methods  Outcome: Progressing

## 2022-04-16 NOTE — PROGRESS NOTES
Progress Note - General Surgery   Shara Gautam 60 y o  male MRN: [id number]  Unit/Bed#: [facility]-01 Encounter: [id number]    Assessment:  - POD1 s/p laparoscopic cholecystectomy, Brigido drain placement,  Intraop findings of acute gangrenous cholecystitis with abscess and hepatic abscess in between the gallbladder and the liver bed  - Brigido drain with 70cc non-bilious serosanguinous output overnight  - Sepsis, POA, due to acute cholecystitis a/e/b fever (T: 102 6*F), tachycardia (HR ) and tachypnea (RR 23-36) treated empirically with IV Zosyn, IVF, Internal Medicine consult and plan for cholecystectomy on 4/15  Sepsis criteria  Afebrile  WBC 7 13  Tbili 0 69  Alk phos 114  AST/ALT 87/53      Plan:  Advance to surgical soft diet this morning  Medicate PRN pain/nausea  IV PPI  IVF hydration  Monitor I/Os  Empty and record drain output q shift  Heparin anticoagulation  SCD's  Follow qAM CBC and BMP  OOB ambulating  Anticipate discharge in the next 24 hours pending drain output and diet tolerance  Appreciate SLIM recs for comorbidities    Subjective/Objective     Subjective: States he feels good this morning  Mild abdominal discomfort with sitting up  Denies fevers/chills  Tolerating clear liquids without issue  Has been OOB to void  Passing gas  No BM  Objective:  Blood pressure 139/86, pulse 69, temperature 97 7 °F (36 5 °C), resp  rate 16, height 6' 4" (1 93 m), weight 132 kg (292 lb), SpO2 94 %  ,Body mass index is 35 54 kg/m²  Intake/Output Summary (Last 24 hours) at 4/16/2022 0714  Last data filed at 4/16/2022 0455  Gross per 24 hour   Intake 1700 ml   Output 1270 ml   Net 430 ml       Invasive Devices  Report    Peripheral Intravenous Line            Peripheral IV 04/15/22 Dorsal (posterior); Left Wrist 1 day          Drain            Closed/Suction Drain Right RLQ Bulb 19 Fr  <1 day                Physical Exam:   Gen: AxOx3  Heart: RRR  Lungs: CTA  Abd: port site incisions well approximated with glue  Drain site with scant drainage onto drain sponge  Appropriate incisional tenderness  Bowel sounds present  No guarding or rebound  Lab, Imaging and other studies:  I have personally reviewed pertinent lab results      CBC:   Lab Results   Component Value Date    WBC 7 13 04/16/2022    HGB 13 4 04/16/2022    HCT 38 6 04/16/2022    MCV 91 04/16/2022     04/16/2022    MCH 31 5 04/16/2022    MCHC 34 7 04/16/2022    RDW 14 8 04/16/2022    MPV 11 1 04/16/2022    NRBC 0 04/16/2022     CMP:   Lab Results   Component Value Date    SODIUM 136 04/16/2022    K 3 6 04/16/2022     04/16/2022    CO2 27 04/16/2022    BUN 16 04/16/2022    CREATININE 1 05 04/16/2022    CALCIUM 8 0 (L) 04/16/2022    AST 87 (H) 04/16/2022    ALT 53 04/16/2022    ALKPHOS 114 04/16/2022    EGFR 77 04/16/2022     VTE Pharmacologic Prophylaxis: Heparin  VTE Mechanical Prophylaxis: sequential compression device      Sepideh Ho PA-C

## 2022-04-16 NOTE — ASSESSMENT & PLAN NOTE
- presented with five-day history of abdominal pain, nausea, and vomiting  - CT scan and right upper quadrant consistent with acute cholecystitis    - status post laparoscopic cholecystectomy on 04/15; intraoperative findings consistent with gangrenous cholecystitis  - treated with IV Zosyn while inpatient; will continue on a 5 day course of p o   Augmentin per General surgery  - outpatient follow-up with General surgery

## 2022-04-17 VITALS
DIASTOLIC BLOOD PRESSURE: 77 MMHG | HEART RATE: 61 BPM | TEMPERATURE: 97.5 F | SYSTOLIC BLOOD PRESSURE: 130 MMHG | WEIGHT: 292 LBS | RESPIRATION RATE: 19 BRPM | OXYGEN SATURATION: 96 % | BODY MASS INDEX: 35.56 KG/M2 | HEIGHT: 76 IN

## 2022-04-17 LAB
ANION GAP SERPL CALCULATED.3IONS-SCNC: 9 MMOL/L (ref 4–13)
BASOPHILS # BLD MANUAL: 0 THOUSAND/UL (ref 0–0.1)
BASOPHILS NFR MAR MANUAL: 0 % (ref 0–1)
BUN SERPL-MCNC: 20 MG/DL (ref 5–25)
CALCIUM SERPL-MCNC: 7.9 MG/DL (ref 8.3–10.1)
CHLORIDE SERPL-SCNC: 105 MMOL/L (ref 100–108)
CO2 SERPL-SCNC: 26 MMOL/L (ref 21–32)
CREAT SERPL-MCNC: 0.98 MG/DL (ref 0.6–1.3)
DACRYOCYTES BLD QL SMEAR: PRESENT
EOSINOPHIL # BLD MANUAL: 0.13 THOUSAND/UL (ref 0–0.4)
EOSINOPHIL NFR BLD MANUAL: 2 % (ref 0–6)
ERYTHROCYTE [DISTWIDTH] IN BLOOD BY AUTOMATED COUNT: 14.9 % (ref 11.6–15.1)
GFR SERPL CREATININE-BSD FRML MDRD: 84 ML/MIN/1.73SQ M
GLUCOSE SERPL-MCNC: 107 MG/DL (ref 65–140)
HCT VFR BLD AUTO: 36.7 % (ref 36.5–49.3)
HGB BLD-MCNC: 12.6 G/DL (ref 12–17)
LG PLATELETS BLD QL SMEAR: PRESENT
LYMPHOCYTES # BLD AUTO: 1.46 THOUSAND/UL (ref 0.6–4.47)
LYMPHOCYTES # BLD AUTO: 22 % (ref 14–44)
MCH RBC QN AUTO: 31.4 PG (ref 26.8–34.3)
MCHC RBC AUTO-ENTMCNC: 34.3 G/DL (ref 31.4–37.4)
MCV RBC AUTO: 92 FL (ref 82–98)
MONOCYTES # BLD AUTO: 0.27 THOUSAND/UL (ref 0–1.22)
MONOCYTES NFR BLD: 4 % (ref 4–12)
MYELOCYTES NFR BLD MANUAL: 1 % (ref 0–1)
NEUTROPHILS # BLD MANUAL: 4.72 THOUSAND/UL (ref 1.85–7.62)
NEUTS BAND NFR BLD MANUAL: 2 % (ref 0–8)
NEUTS SEG NFR BLD AUTO: 69 % (ref 43–75)
PLATELET # BLD AUTO: 202 THOUSANDS/UL (ref 149–390)
PLATELET BLD QL SMEAR: ADEQUATE
PMV BLD AUTO: 10.8 FL (ref 8.9–12.7)
POLYCHROMASIA BLD QL SMEAR: PRESENT
POTASSIUM SERPL-SCNC: 3.2 MMOL/L (ref 3.5–5.3)
RBC # BLD AUTO: 4.01 MILLION/UL (ref 3.88–5.62)
RBC MORPH BLD: PRESENT
SODIUM SERPL-SCNC: 140 MMOL/L (ref 136–145)
STOMATOCYTES BLD QL SMEAR: PRESENT
WBC # BLD AUTO: 6.65 THOUSAND/UL (ref 4.31–10.16)

## 2022-04-17 PROCEDURE — C9113 INJ PANTOPRAZOLE SODIUM, VIA: HCPCS | Performed by: STUDENT IN AN ORGANIZED HEALTH CARE EDUCATION/TRAINING PROGRAM

## 2022-04-17 PROCEDURE — 90471 IMMUNIZATION ADMIN: CPT | Performed by: STUDENT IN AN ORGANIZED HEALTH CARE EDUCATION/TRAINING PROGRAM

## 2022-04-17 PROCEDURE — 99232 SBSQ HOSP IP/OBS MODERATE 35: CPT | Performed by: INTERNAL MEDICINE

## 2022-04-17 PROCEDURE — 85025 COMPLETE CBC W/AUTO DIFF WBC: CPT | Performed by: PHYSICIAN ASSISTANT

## 2022-04-17 PROCEDURE — 85007 BL SMEAR W/DIFF WBC COUNT: CPT | Performed by: PHYSICIAN ASSISTANT

## 2022-04-17 PROCEDURE — 80048 BASIC METABOLIC PNL TOTAL CA: CPT | Performed by: PHYSICIAN ASSISTANT

## 2022-04-17 PROCEDURE — 85027 COMPLETE CBC AUTOMATED: CPT | Performed by: PHYSICIAN ASSISTANT

## 2022-04-17 PROCEDURE — 90682 RIV4 VACC RECOMBINANT DNA IM: CPT | Performed by: STUDENT IN AN ORGANIZED HEALTH CARE EDUCATION/TRAINING PROGRAM

## 2022-04-17 RX ORDER — ACETAMINOPHEN 325 MG/1
650 TABLET ORAL EVERY 6 HOURS PRN
Qty: 30 TABLET | Refills: 0 | Status: SHIPPED | OUTPATIENT
Start: 2022-04-17

## 2022-04-17 RX ORDER — IBUPROFEN 600 MG/1
600 TABLET ORAL EVERY 6 HOURS PRN
Qty: 30 TABLET | Refills: 0 | Status: SHIPPED | OUTPATIENT
Start: 2022-04-17

## 2022-04-17 RX ORDER — POTASSIUM CHLORIDE 20 MEQ/1
40 TABLET, EXTENDED RELEASE ORAL ONCE
Status: COMPLETED | OUTPATIENT
Start: 2022-04-17 | End: 2022-04-17

## 2022-04-17 RX ORDER — AMOXICILLIN AND CLAVULANATE POTASSIUM 875; 125 MG/1; MG/1
1 TABLET, FILM COATED ORAL EVERY 12 HOURS SCHEDULED
Qty: 10 TABLET | Refills: 0 | Status: SHIPPED | OUTPATIENT
Start: 2022-04-17 | End: 2022-04-22

## 2022-04-17 RX ADMIN — INFLUENZA A VIRUS A/WISCONSIN/588/2019 (H1N1) RECOMBINANT HEMAGGLUTININ ANTIGEN, INFLUENZA A VIRUS A/TASMANIA/503/2020 (H3N2) RECOMBINANT HEMAGGLUTININ ANTIGEN, INFLUENZA B VIRUS B/WASHINGTON/02/2019 RECOMBINANT HEMAGGLUTININ ANTIGEN, AND INFLUENZA B VIRUS B/PHUKET/3073/2013 RECOMBINANT HEMAGGLUTININ ANTIGEN 0.5 ML: 45; 45; 45; 45 INJECTION INTRAMUSCULAR at 11:49

## 2022-04-17 RX ADMIN — IBUPROFEN 600 MG: 600 TABLET ORAL at 08:10

## 2022-04-17 RX ADMIN — AMLODIPINE BESYLATE 10 MG: 10 TABLET ORAL at 08:12

## 2022-04-17 RX ADMIN — POTASSIUM CHLORIDE 40 MEQ: 20 TABLET, EXTENDED RELEASE ORAL at 08:10

## 2022-04-17 RX ADMIN — PIPERACILLIN AND TAZOBACTAM 3.38 G: 36; 4.5 INJECTION, POWDER, FOR SOLUTION INTRAVENOUS at 04:09

## 2022-04-17 RX ADMIN — PANTOPRAZOLE SODIUM 40 MG: 40 INJECTION, POWDER, FOR SOLUTION INTRAVENOUS at 08:10

## 2022-04-17 RX ADMIN — ENOXAPARIN SODIUM 40 MG: 40 INJECTION SUBCUTANEOUS at 08:10

## 2022-04-17 RX ADMIN — PIPERACILLIN AND TAZOBACTAM 3.38 G: 36; 4.5 INJECTION, POWDER, FOR SOLUTION INTRAVENOUS at 10:52

## 2022-04-17 NOTE — DISCHARGE SUMMARY
Discharge Summary - Ciara Fox 60 y o  male MRN: [id number]    Unit/Bed#: -01 Encounter: [id number]    Admission Date:   Admission Orders (From admission, onward)     Ordered        04/14/22 1348  Inpatient Admission  Once                        Admitting Diagnosis: Cholecystitis [K81 9]  SOB (shortness of breath) [R06 02]  Shortness of breath at rest [R06 02]    HPI: The patient presents due to abdominal pain which was worse approximately 4 days ago  He also had nausea and vomiting at that time  The patient presents to emergency room today due to persistent shortness of breath  On examination he does not have any significant abdominal tenderness  CT scan shows findings concerning for acute cholecystitis  Will obtain ultrasound of the abdomen  Will also have Medicine evaluate the patient for shortness of breath  Procedures Performed:   Orders Placed This Encounter   Procedures    ED ECG Documentation Only       Summary of Hospital Course: Presented with above complains and underwent above procedure  POD1 ehe patient is doing well  He does have expected postoperative pain however, he is feeling better than preoperatively  He denies any nausea  The incisions are intact  There is serosanguineous drainage within the KHARI bulb  Will plan to advance his diet  Continue IV Zosyn today  Out of bed as tolerated  Discontinue IV fluids  Will likely discharge home with the drain in place tomorrow  POD2 No acute events  Tolerating diet  Denies fevers/chills  No n/v  Continues passing gas  Small loose stool x1  Minimal incisional discomfort  Afebrile, VSS  Ambulating and voiding without issue  KHARI with 65cc serosanguinous output last 24 hours  Discharged home      Significant Findings, Care, Treatment and Services Provided:   Operative Findings:  Acute gangrenous cholecystitis with abscess and hepatic abscess in between the gallbladder and the liver bed     Complications: none    Discharge Diagnosis: Acute gangrenous cholecystitis with abscess and hepatic abscess    Medical Problems                 Condition at Discharge: good     Discharge instructions/Information to patient and family:   See after visit summary for information provided to patient and family  Provisions for Follow-Up Care:  See after visit summary for information related to follow-up care and any pertinent home health orders  PCP: No primary care provider on file  Disposition: Home    Planned Readmission: No    Discharge Statement   I spent 30 minutes discharging the patient  This time was spent on the day of discharge  I had direct contact with the patient on the day of discharge  Additional documentation is required if more than 30 minutes were spent on discharge  Discharge Medications:  See after visit summary for reconciled discharge medications provided to patient and family          Otilio Arredondo PA-C

## 2022-04-17 NOTE — NURSING NOTE
Reviewed discharge instructions, med rec, follow up appointment, worsening s/s symptoms when to return to ER, handout given printed from [facility] on [facility] discharge home care, demonstrated emptying, dressing change, I & 0 supplies given , worsening s/s symptoms when to call PCP or return to ER verbally understood

## 2022-04-17 NOTE — PROGRESS NOTES
[address]  Progress Note Gabby Muniz 1965, 60 y o  male MRN: [id number]  Unit/Bed#: -01 Encounter: [id number]  Primary Care Provider: No primary care provider on file  Date and time admitted to hospital: 4/14/2022 11:11 AM    * Acute cholecystitis  Assessment & Plan  - presented with five-day history of abdominal pain, nausea, and vomiting  - CT scan and right upper quadrant consistent with acute cholecystitis    - status post laparoscopic cholecystectomy on 04/15; intraoperative findings consistent with gangrenous cholecystitis  - treated with IV Zosyn while inpatient; will continue on a 5 day course of p o  Augmentin per General surgery  - outpatient follow-up with General surgery      Primary hypertension  Assessment & Plan  - okay to resume all home meds on discharge    Obesity  Assessment & Plan  - affects all levels of care; diet and lifestyle modifications recommended    GERD (gastroesophageal reflux disease)  Assessment & Plan  - continue PPI    VTE Pharmacologic Prophylaxis: VTE Score: 6 High Risk (Score >/= 5) - Pharmacological DVT Prophylaxis Ordered: enoxaparin (Lovenox)  Sequential Compression Devices Ordered  Patient Centered Rounds: I performed bedside rounds with nursing staff today  Discussions with Specialists or Other Care Team Provider: FERNIE  General Surgery  Education and Discussions with Family / Patient: Patient declined call to   Time Spent for Care: 20 minutes  More than 50% of total time spent on counseling and coordination of care as described above  Current Length of Stay: 3 day(s)  Current Patient Status: Inpatient   Certification Statement: Patient is being discharged today  Discharge Plan: Discharge today    Code Status: Level 1 - Full Code    Subjective:   Patient seen and examined  Following up for acute cholecystitis status post cholecystectomy  No new complaints today  Remains afebrile    Tolerating diet     Vitals:   Temp (24hrs), Av 6 °F (36 4 °C), Min:97 4 °F (36 3 °C), Max:97 9 °F (36 6 °C)    Temp:  [97 4 °F (36 3 °C)-97 9 °F (36 6 °C)] 97 5 °F (36 4 °C)  HR:  [61-68] 61  Resp:  [17-20] 19  BP: (130-139)/(77-93) 130/77  SpO2:  [95 %-96 %] 96 %  Body mass index is 35 54 kg/m²  Input and Output Summary (last 24 hours): Intake/Output Summary (Last 24 hours) at 2022 1021  Last data filed at 2022 0838  Gross per 24 hour   Intake 830 ml   Output 515 ml   Net 315 ml       PHYSICAL EXAM:    Vitals signs reviewed  Constitutional   Awake and cooperative  NAD  Head/Neck   Normocephalic  Atraumatic  HEENT   No scleral icterus  EOMI  Heart   Regular rate and rhythm  No murmers  Lungs   Clear to auscultation bilaterally  Respirations unlaboured  Abdomen   Obese  Soft and nondistended  Drain and right upper quadrant with serosanguineous fluid  Skin   Skin color normal  No rashes  Extremities   No deformities  No peripheral edema  Neuro   Alert and oriented  No new deficits  Psych   Mood stable  Affect normal          Additional Data:     Labs:  Results from last 7 days   Lab Units 22  05022   WBC Thousand/uL 6 65   < > 7 13   HEMOGLOBIN g/dL 12 6   < > 13 4   HEMATOCRIT % 36 7   < > 38 6   PLATELETS Thousands/uL 202   < > 176   BANDS PCT % 2  --   --    NEUTROS PCT %  --   --  87*   LYMPHS PCT %  --   --  6*   LYMPHO PCT % 22  --   --    MONOS PCT %  --   --  6   MONO PCT % 4  --   --    EOS PCT % 2  --  0    < > = values in this interval not displayed       Results from last 7 days   Lab Units 22  05022  05022  050   SODIUM mmol/L 140   < > 136   POTASSIUM mmol/L 3 2*   < > 3 6   CHLORIDE mmol/L 105   < > 103   CO2 mmol/L 26   < > 27   BUN mg/dL 20   < > 16   CREATININE mg/dL 0 98   < > 1 05   ANION GAP mmol/L 9   < > 6   CALCIUM mg/dL 7 9*   < > 8 0*   ALBUMIN g/dL  --   --  2 2*   TOTAL BILIRUBIN mg/dL  --   --  0 69 ALK PHOS U/L  --   --  114   ALT U/L  --   --  53   AST U/L  --   --  87*   GLUCOSE RANDOM mg/dL 107   < > 184*    < > = values in this interval not displayed  Results from last 7 days   Lab Units 04/15/22  0502   INR  1 09             Results from last 7 days   Lab Units 04/14/22  1126   LACTIC ACID mmol/L 1 0       Lines/Drains:  Invasive Devices  Report    Peripheral Intravenous Line            Peripheral IV 04/15/22 Dorsal (posterior); Left Wrist 2 days          Drain            Closed/Suction Drain Right RLQ Bulb 19 Fr  1 day                      Imaging: No pertinent imaging reviewed  Recent Cultures (last 7 days):         Last 24 Hours Medication List:   Current Facility-Administered Medications   Medication Dose Route Frequency Provider Last Rate    acetaminophen  650 mg Oral Q6H PRN Arvel Pares, DO      ALPRAZolam  1 mg Oral HS PRN Arvel Pares, DO      amLODIPine  10 mg Oral Daily [place], DO      cefazolin  3,000 mg Intravenous Once [facility], DO      enoxaparin  40 mg Subcutaneous Q24H [facility] [place], [place]      hydrALAZINE  10 mg Intravenous Q6H PRN [facility] [place], DO      HYDROmorphone  0 5 mg Intravenous Q4H PRN [facility] Pares, DO      ibuprofen  600 mg Oral Q6H PRN [facility] Pares, DO      ondansetron  4 mg Intravenous Q8H PRN [facility], DO      pantoprazole  40 mg Intravenous Q24H [place], [place]      piperacillin-tazobactam  3 375 g Intravenous Q6H [place], DO 3 375 g (04/17/22 0409)        Today, Patient Was Seen By: [employer] [place], DO    **Please Note: This note may have been constructed using a voice recognition system  **

## 2022-04-17 NOTE — DISCHARGE INSTRUCTIONS
Laparoscopic Cholecystectomy   WHAT YOU NEED TO KNOW:   Laparoscopic cholecystectomy is surgery to remove your gallbladder  DISCHARGE INSTRUCTIONS:   Call Dr Vashti Barton office at [phone] with any questions or concerns    Medicines: You may need any of the following:  · Prescription pain medicine - Take as directed  · Antibiotics: You will take Augmentin every 12 hours for the next 5 days  · You may also take tylenol and motrin as needed for pain    · Take your medicine as directed  Contact your healthcare provider if you think your medicine is not helping or if you have side effects  Tell her if you are allergic to any medicine  Keep a list of the medicines, vitamins, and herbs you take  Include the amounts, and when and why you take them  Bring the list or the pill bottles to follow-up visits  Carry your medicine list with you in case of an emergency  Drain care / follow up: empty and record the drain output every 12 hours  Call Dr Vashti Barton office when thedrain output is less than 30cc for a 24 hour period  The drain can be removed in the office at that time  Otherwise, you will be seen in the office on Thursday April 21st     Wound care: You may shower and pat the incisions dry  Keep the drain site as dry as possible while showering until after it is removed  Do not soak underwater for 2 weeks   What to eat after surgery: You may eat whatever you feel up to following surgery  You may notice diarrhea with fatty foods  Drink plenty of liquids  When to return to work and other activities: You may return to work on Monday April 25th  No lifting, pushing, or pulling greater then 10lb for 2 weeks  Walk as much as possible  YOU may drive when you are comfortable enough to turn and press the brake/gas/clutch without pain medication    Contact your healthcare provider if:   · You have a fever over 101°F (38°C) or chills  · You have pain or nausea that is not relieved by medicine  · You have redness and swelling around your incisions, or blood or pus is leaking from your incisions  · You are constipated or have diarrhea  · Your skin or eyes are yellow, or your bowel movements are pale  · You have questions or concerns about your surgery, condition, or care  Seek care immediately or call 911 if:   · You cannot stop vomiting  · Your bowel movements are black or bloody  · You have pain in your abdomen and it is swollen or hard  · Your arm or leg feels warm, tender, and painful  It may look swollen and red  · You feel lightheaded, short of breath, and have chest pain  · You cough up blood  © 2017 [address] [employer] Information is for End User's use only and may not be sold, redistributed or otherwise used for commercial purposes  All illustrations and images included in CareNotes® are the copyrighted property of [employer]  or Easton Curry  The above information is an  only  It is not intended as medical advice for individual conditions or treatments  Talk to your doctor, nurse or pharmacist before following any medical regimen to see if it is safe and effective for you

## 2022-04-17 NOTE — PROGRESS NOTES
Progress Note - General Surgery   Elpidio Kowalski 60 y o  male MRN: [id number]  Unit/Bed#: [facility]-01 Encounter: [id number]    Assessment:  - POD2 s/p laparoscopic cholecystectomy, Brigido drain placement,  Intraop findings of acute gangrenous cholecystitis with abscess and hepatic abscess in between the gallbladder and the liver bed  - Brigido drain with 65cc non-bilious serosanguinous output overnight  - Sepsis, POA, due to acute cholecystitis a/e/b fever (T: 102 6*F), tachycardia (HR ) and tachypnea (RR 23-36) treated empirically with IV Zosyn, IVF, Internal Medicine consult and plan for cholecystectomy on 4/15  Sepsis criteria  Afebrile, VSS  WBC 6 65 (7 13)  Hypokalemia, 3 2      Plan:  Diet as tolerated  Replenish K+ this AM  Discharge home later today with drain  Empty and record drain output q24h, call office when <30cc over 24 hour period  Follow up for drain removal and wound check with Berhane Haynes    Subjective/Objective     Subjective: No acute events  Tolerating diet  Denies fevers/chills  No n/v  Continues passing gas  Small loose stool x1  States that he received Xanax last evening prior to sleep and awoke an hour later confused and sweating  No similar issues since that time  Objective:   Blood pressure 135/93, pulse 62, temperature (!) 97 4 °F (36 3 °C), resp  rate 20, height 6' 4" (1 93 m), weight 132 kg (292 lb), SpO2 96 %  ,Body mass index is 35 54 kg/m²  Intake/Output Summary (Last 24 hours) at 4/17/2022 0715  Last data filed at 4/16/2022 2138  Gross per 24 hour   Intake 1130 ml   Output 535 ml   Net 595 ml       Invasive Devices  Report    Peripheral Intravenous Line            Peripheral IV 04/15/22 Dorsal (posterior); Left Wrist 2 days          Drain            Closed/Suction Drain Right RLQ Bulb 19 Fr  1 day                Physical Exam:  Gen: AxOx3  Heart: RRR  Lungs: CTA  Abd: port site incisions well approximated with glue  Appropriate incisional tenderness   Bowel sounds present  No guarding or rebound  Scant serosanuinous drainage in bulb at this time  No obvious purulence or bile        Lab, Imaging and other studies:  I have personally reviewed pertinent lab results      CBC:   Lab Results   Component Value Date    WBC 6 65 04/17/2022    HGB 12 6 04/17/2022    HCT 36 7 04/17/2022    MCV 92 04/17/2022     04/17/2022    MCH 31 4 04/17/2022    MCHC 34 3 04/17/2022    RDW 14 9 04/17/2022    MPV 10 8 04/17/2022     CMP:   Lab Results   Component Value Date    SODIUM 140 04/17/2022    K 3 2 (L) 04/17/2022     04/17/2022    CO2 26 04/17/2022    BUN 20 04/17/2022    CREATININE 0 98 04/17/2022    CALCIUM 7 9 (L) 04/17/2022    EGFR 84 04/17/2022     VTE Pharmacologic Prophylaxis: Enoxaparin (Lovenox)  VTE Mechanical Prophylaxis: sequential compression device     Angella Royal PA-C

## 2022-04-18 NOTE — UTILIZATION REVIEW
Notification of Discharge   This is a Notification of Discharge from our facility [address]  Please be advised that this patient has been discharge from our facility  Below you will find the admission and discharge date and time including the patients disposition  UTILIZATION REVIEW CONTACT:  Avis Pastrana  Utilization   Network Utilization Review Department  Phone: [phone] x carefully listen to the prompts  All voicemails are confidential   Email: [email or web address]     PHYSICIAN ADVISORY SERVICES:  FOR [id number] REVIEW - MEDICAL NECESSITY DENIAL  Phone: [phone]  Fax: [phone]  Email: [email or web address]     PRESENTATION DATE: 4/14/2022 11:11 AM  OBERVATION ADMISSION DATE:  INPATIENT ADMISSION DATE: 4/14/22  1:48 PM   DISCHARGE DATE: 4/17/2022  1:19 PM  DISPOSITION: Home/Self Care Home/Self Care      IMPORTANT INFORMATION:  Send all requests for admission clinical reviews, approved or denied determinations and any other requests to dedicated fax number below belonging to the campus where the patient is receiving treatment   List of dedicated fax numbers:  [address] [place] [address] DENIALS (Administrative/Medical Necessity) [phone]   [address]  (Maternity/NICU/Pediatrics) [phone]   [facility] [phone]   [address] [phone]   [address] [place] [address] [phone]   [address] [facility] [address] [place] [address],[address] [place] [address] [phone]   [facility]  [phone]   [address] [facility]  [address] [facility] [address] [facility] [phone]

## 2023-09-14 ENCOUNTER — APPOINTMENT (OUTPATIENT)
Dept: RADIOLOGY | Facility: CLINIC | Age: 58
End: 2023-09-14
Payer: COMMERCIAL

## 2023-09-14 ENCOUNTER — OFFICE VISIT (OUTPATIENT)
Dept: URGENT CARE | Facility: CLINIC | Age: 58
End: 2023-09-14
Payer: COMMERCIAL

## 2023-09-14 VITALS
BODY MASS INDEX: 35.8 KG/M2 | SYSTOLIC BLOOD PRESSURE: 147 MMHG | HEART RATE: 80 BPM | WEIGHT: 294 LBS | OXYGEN SATURATION: 96 % | RESPIRATION RATE: 18 BRPM | TEMPERATURE: 98.1 F | DIASTOLIC BLOOD PRESSURE: 85 MMHG | HEIGHT: 76 IN

## 2023-09-14 DIAGNOSIS — M25.511 ACUTE PAIN OF RIGHT SHOULDER: Primary | ICD-10-CM

## 2023-09-14 DIAGNOSIS — M25.511 ACUTE PAIN OF RIGHT SHOULDER: ICD-10-CM

## 2023-09-14 PROCEDURE — 73030 X-RAY EXAM OF SHOULDER: CPT

## 2023-09-14 PROCEDURE — 99204 OFFICE O/P NEW MOD 45 MIN: CPT

## 2023-09-14 PROCEDURE — 96372 THER/PROPH/DIAG INJ SC/IM: CPT

## 2023-09-14 RX ORDER — LISINOPRIL 10 MG/1
TABLET ORAL
COMMUNITY

## 2023-09-14 RX ORDER — METHYLPREDNISOLONE 4 MG/1
TABLET ORAL
Qty: 21 TABLET | Refills: 0 | Status: SHIPPED | OUTPATIENT
Start: 2023-09-14

## 2023-09-14 RX ORDER — KETOROLAC TROMETHAMINE 30 MG/ML
30 INJECTION, SOLUTION INTRAMUSCULAR; INTRAVENOUS ONCE
Status: COMPLETED | OUTPATIENT
Start: 2023-09-14 | End: 2023-09-14

## 2023-09-14 RX ADMIN — KETOROLAC TROMETHAMINE 30 MG: 30 INJECTION, SOLUTION INTRAMUSCULAR; INTRAVENOUS at 14:52

## 2023-09-14 NOTE — PROGRESS NOTES
[facility]        NAME: Denny Rossi is a 59 y.o. male  : 1965    MRN: [id number]  DATE: 2023  TIME: 3:20 PM    Assessment and Plan   Acute pain of right shoulder [M25.511]  1. Acute pain of right shoulder  XR shoulder 2+ vw right    ketorolac (TORADOL) injection 30 mg    methylPREDNISolone 4 MG tablet therapy pack        No obvious fracture or dislocation on exam.  Symptoms most likely represent a tendinitis or bursitis. Able to perform liftoff test without any difficulties. Able to perform abduction and forward flexion to 90 degrees when pain starts. Empty can test showed good strength with mild reproduction in pain - no significant weakness. Pain/sx reproduced with Neer's testing. We will give IM Toradol 30 mg followed by prescription for Medrol Dosepak to start tomorrow. Offered patient referral for his physical therapy-he states he will see how the medication works and follow-up with his family doctor. Advised to go to the ER if any symptoms worsen. Patient Instructions     Take prescribed medication as instructed. Do not start steroid pack until tomorrow. Avoid ibuprofen until steroid pack is finished. May try Tylenol for pain. Recommended to apply ice to the affected area 3-4 times daily for 10 to 15 minutes. Use insulation on ice to avoid frostbite. Avoid heavy lifting or extensive use of the right shoulder until symptoms improve. If you are not having any improvement in symptoms-follow-up with your family doctor. If any symptoms suddenly worsen-go to the emergency room. Follow up with PCP in 3-5 days. Proceed to  ER if symptoms worsen. Chief Complaint     Chief Complaint   Patient presents with   • Shoulder Pain     Right Shoulder pain. Symptoms started Saturday night and when he woke up on  he couldn't move his arm and even dangling and walking it hurts.  No injuries except for he works out and has torn his rotator cuff from lifting but hasn't worked in 6 weeks. History of Present Illness       28-year-old male presents to the clinic with right anterior shoulder pain that began on Saturday into Sunday of this past weekend on 9/9 to 9/10. PT states that he did not injure it or fall. Denies any excessive heavy lifting. PT states that he used to lift weights frequently, still goes to the gym but has not been doing any weight training in the past 6 to 8 weeks. PT states it is worse when moving the shoulder in all directions as well as when walking. PT states he works as an  and does not do frequent heavy lifting at work. Denies any prior injury or surgery to the right shoulder. He has been using an NSAID cream over-the-counter without much relief. PT states that yesterday he had improvement in symptoms, however, states that it has been worse today despite any known identifiers of causation. PT states that the pain slightly radiates down the right side of the arm. He denies any neck pain, fever, chills, chest pain, shortness of breath, numbness, tingling, hand weakness, decrease in strength. Review of Systems   Review of Systems   Constitutional: Negative. HENT: Negative. Respiratory: Negative. Cardiovascular: Negative. Gastrointestinal: Negative. Musculoskeletal: Positive for arthralgias. Skin: Negative. Neurological: Negative. Current Medications       Current Outpatient Medications:   •  amLODIPine (NORVASC) 10 mg tablet, Take 10 mg by mouth daily, Disp: , Rfl:   •  furosemide (LASIX) 20 mg tablet, Take 20 mg by mouth daily, Disp: , Rfl:   •  hydrochlorothiazide (HYDRODIURIL) 12.5 mg tablet, Take 12.5 mg by mouth daily, Disp: , Rfl:   •  lisinopril (ZESTRIL) 10 mg tablet, , Disp: , Rfl:   •  methylPREDNISolone 4 MG tablet therapy pack, Use as directed on package.  Start tomorrow on 9/15/2023., Disp: 21 tablet, Rfl: 0  •  omeprazole (PriLOSEC) 40 MG capsule, Take 40 mg by mouth daily, Disp: , Rfl: •  acetaminophen (TYLENOL) 325 mg tablet, Take 2 tablets (650 mg total) by mouth every 6 (six) hours as needed for mild pain (Patient not taking: Reported on 9/14/2023), Disp: 30 tablet, Rfl: 0  •  fenofibrate (TRICOR) 54 MG tablet, Take 54 mg by mouth daily (Patient not taking: Reported on 4/14/2022), Disp: , Rfl:   •  ibuprofen (MOTRIN) 600 mg tablet, Take 1 tablet (600 mg total) by mouth every 6 (six) hours as needed for moderate pain (Patient not taking: Reported on 9/14/2023), Disp: 30 tablet, Rfl: 0  •  lisinopril (ZESTRIL) 40 mg tablet, Take 40 mg by mouth daily (Patient not taking: Reported on 9/14/2023), Disp: , Rfl:   No current facility-administered medications for this visit. Current Allergies     Allergies as of 09/14/2023   • (No Known Allergies)            The following portions of the patient's history were reviewed and updated as appropriate: allergies, current medications, past family history, past medical history, past social history, past surgical history and problem list.     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 No pertinent family history. Medications have been verified. Objective   /85   Pulse 80   Temp 98.1 °F (36.7 °C)   Resp 18   Ht 6' 4" (1.93 m)   Wt 133 kg (294 lb)   SpO2 96%   BMI 35.79 kg/m²        Physical Exam     Physical Exam  Constitutional:       General: He is not in acute distress. Appearance: Normal appearance. He is not ill-appearing. HENT:      Head: Normocephalic and atraumatic.    Eyes:      Extraocular Movements: Extraocular movements intact. Pupils: Pupils are equal, round, and reactive to light. Cardiovascular:      Rate and Rhythm: Normal rate and regular rhythm. Pulses: Normal pulses. Heart sounds: Normal heart sounds. Pulmonary:      Effort: Pulmonary effort is normal.      Breath sounds: Normal breath sounds. Musculoskeletal:      Right shoulder: Tenderness (Tenderness to palpation in the right superior/anterior shoulder region. Worse with abduction and forward flexion. No overlying erythema, ecchymosis, wound. No obvious deformity.) present. No swelling, deformity or effusion. Left shoulder: Normal.      Right upper arm: Normal.      Left upper arm: Normal.      Right elbow: Normal.      Left elbow: Normal.      Right forearm: Normal.      Left forearm: Normal.      Right wrist: Normal.      Left wrist: Normal.      Right hand: Normal.      Left hand: Normal.        Arms:       Cervical back: Normal range of motion and neck supple. No rigidity or tenderness. Comments: PT able to perform flexion and abduction to 90 degrees-this is where he starts to feel more significant pain. Able to perform internal/external rotation without significant difficulty. Normal lift off test.  Empty can testing revealed some reproducible pain but no weakness or drop in right arm. Most of the reproducible pain came with Neer's testing. Equal  strength bilaterally. Equal sensation bilaterally in the upper extremities. Flexion of the elbow against resistance reproduces some pain in the right shoulder. Lymphadenopathy:      Cervical: No cervical adenopathy. Skin:     General: Skin is warm and dry. Capillary Refill: Capillary refill takes less than 2 seconds. Findings: No rash. Neurological:      General: No focal deficit present. Mental Status: He is alert and oriented to person, place, and time. Mental status is at baseline.    Psychiatric:         Mood and Affect: Mood normal. Behavior: Behavior normal.

## 2023-09-14 NOTE — PATIENT INSTRUCTIONS
Take prescribed medication as instructed. Do not start steroid pack until tomorrow. Avoid ibuprofen until steroid pack is finished. May try Tylenol for pain. Recommended to apply ice to the affected area 3-4 times daily for 10 to 15 minutes. Use insulation on ice to avoid frostbite. Avoid heavy lifting or extensive use of the right shoulder until symptoms improve. If you are not having any improvement in symptoms-follow-up with your family doctor. If any symptoms suddenly worsen-go to the emergency room. Follow up with PCP in 3-5 days. Proceed to  ER if symptoms worsen. Tendinitis   WHAT YOU NEED TO KNOW:   Tendinitis is painful inflammation or breakdown of your tendons. It may also be called tendinopathy. Tendinitis often occurs in the knee, shoulder, ankle, hip, or elbow. DISCHARGE INSTRUCTIONS:   Return to the emergency department if:   You have increased redness over the joint, or swelling in the joint. You suddenly cannot move your joint. Call your doctor if:   You have increased pain even after you take medicine. You have questions or concerns about your condition or care. Medicines:   Acetaminophen  decreases pain and fever. It is available without a doctor's order. Ask how much to take and how often to take it. Follow directions. Read the labels of all other medicines you are using to see if they also contain acetaminophen, or ask your doctor or pharmacist. Acetaminophen can cause liver damage if not taken correctly. NSAIDs , such as ibuprofen, help decrease swelling, pain, and fever. This medicine is available with or without a doctor's order. NSAIDs can cause stomach bleeding or kidney problems in certain people. If you take blood thinner medicine, always ask your healthcare provider if NSAIDs are safe for you. Always read the medicine label and follow directions. Take your medicine as directed.   Contact your healthcare provider if you think your medicine is not helping or if you have side effects. Tell your provider if you are allergic to any medicine. Keep a list of the medicines, vitamins, and herbs you take. Include the amounts, and when and why you take them. Bring the list or the pill bottles to follow-up visits. Carry your medicine list with you in case of an emergency. Manage tendinitis:   Rest your tendon  as directed to help it heal. Ask your healthcare provider if you need to stop putting weight on your affected area. Apply ice  to help decrease swelling and pain. Use an ice pack, or put crushed ice in a plastic bag. Cover the bag with a towel before you place it on the affected area. Apply ice for 10 to 15 minutes every hour, or as directed. Use a support device , such as a cane, splint, shoe insert, or brace. A support device may help reduce your pain. Go to physical therapy  if directed. A physical therapist can teach you exercises to help improve movement and strength, and to decrease pain. You may also learn how to improve your posture, and how to lift or exercise correctly. Prevent tendinitis:   Warm up and cool down when you exercise. Run in place slowly or walk at a brisk pace to warm your muscles before you exercise. When you finish exercising, walk for a few minutes to cool down. Exercise regularly  to strengthen the muscles around your joint. Ease into an exercise routine for the first 3 weeks to prevent another injury. Ask your healthcare provider about the best exercise plan for you. Rest fully between activities. Use the right equipment  for sports and exercise. Wear braces or tape around weak joints as directed. Follow up with your doctor as directed:  Write down your questions so you remember to ask them during your visits. © Copyright [facility] 2022 Information is for End User's use only and may not be sold, redistributed or otherwise used for commercial purposes. The above information is an  only.  It is not intended as medical advice for individual conditions or treatments. Talk to your doctor, nurse or pharmacist before following any medical regimen to see if it is safe and effective for you.

## (undated) DEVICE — SURGICAL CLIPPER BLADE GENERAL USE

## (undated) DEVICE — TROCAR: Brand: KII SLEEVE

## (undated) DEVICE — LIGACLIP 10-M/L, 10MM ENDOSCOPIC ROTATING MULTIPLE CLIP APPLIERS: Brand: LIGACLIP

## (undated) DEVICE — PDS II VLT 0 107CM AG ST3: Brand: ENDOLOOP

## (undated) DEVICE — SYRINGE 10ML LL

## (undated) DEVICE — ALLENTOWN LAP CHOLE APP PACK: Brand: CARDINAL HEALTH

## (undated) DEVICE — SUT ETHILON 4-0 PS-2 18 IN 1667H

## (undated) DEVICE — JACKSON-PRATT 100CC BULB RESERVOIR: Brand: CARDINAL HEALTH

## (undated) DEVICE — SINGLE PORT MANIFOLD: Brand: NEPTUNE 2

## (undated) DEVICE — ELECTRODE LAP J HOOK E-Z CLEAN 33CM-0021

## (undated) DEVICE — JP CHANNEL DRAIN 19FR, FULL FLUTES: Brand: JACKSON-PRATT

## (undated) DEVICE — ADHESIVE SKIN HIGH VISCOSITY EXOFIN 1ML

## (undated) DEVICE — TROCAR: Brand: KII FIOS FIRST ENTRY

## (undated) DEVICE — TUBING SMOKE EVAC W/FILTRATION DEVICE PLUMEPORT ACTIV

## (undated) DEVICE — LAPAROSCOPIC SMOKE EVAC TUBING

## (undated) DEVICE — TUBING INSUFFLATION SET ISO CONNECTOR

## (undated) DEVICE — BULB SYRINGE,IRRIGATION WITH PROTECTIVE CAP: Brand: DOVER

## (undated) DEVICE — PAD GROUNDING ADULT

## (undated) DEVICE — DRAIN SPONGES,6 PLY: Brand: EXCILON

## (undated) DEVICE — PMI DISPOSABLE PUNCTURE CLOSURE DEVICE / SUTURE GRASPER: Brand: PMI

## (undated) DEVICE — GLOVE INDICATOR PI UNDERGLOVE SZ 6.5 BLUE

## (undated) DEVICE — ENDOPOUCH RETRIEVER SPECIMEN RETRIEVAL BAGS: Brand: ENDOPOUCH RETRIEVER

## (undated) DEVICE — ENDOPATH PNEUMONEEDLE INSUFFLATION NEEDLES WITH LUER LOCK CONNECTORS 120MM: Brand: ENDOPATH

## (undated) DEVICE — PENCIL ELECTROSURG E-Z CLEAN -0035H

## (undated) DEVICE — GLOVE SRG BIOGEL 6

## (undated) DEVICE — SUT VICRYL 0 REEL 54 IN J287G

## (undated) DEVICE — IRRIG ENDO FLO TUBING

## (undated) DEVICE — CHLORAPREP HI-LITE 26ML ORANGE

## (undated) DEVICE — DRAPE EQUIPMENT RF WAND

## (undated) DEVICE — INTENDED FOR TISSUE SEPARATION, AND OTHER PROCEDURES THAT REQUIRE A SHARP SURGICAL BLADE TO PUNCTURE OR CUT.: Brand: BARD-PARKER SAFETY BLADES SIZE 11, STERILE

## (undated) DEVICE — SUT VICRYL 4-0 PS-2 27 IN J426H

## (undated) DEVICE — SCD SEQUENTIAL COMPRESSION COMFORT SLEEVE MEDIUM KNEE LENGTH: Brand: KENDALL SCD

## (undated) DEVICE — VISUALIZATION SYSTEM: Brand: CLEARIFY

## (undated) DEVICE — SYRINGE 50ML LL

## (undated) DEVICE — HARMONIC 1100 SHEARS, 36CM SHAFT LENGTH: Brand: HARMONIC